# Patient Record
Sex: FEMALE | NOT HISPANIC OR LATINO | Employment: STUDENT | URBAN - METROPOLITAN AREA
[De-identification: names, ages, dates, MRNs, and addresses within clinical notes are randomized per-mention and may not be internally consistent; named-entity substitution may affect disease eponyms.]

---

## 2018-11-13 ENCOUNTER — MEDICAL CORRESPONDENCE (OUTPATIENT)
Dept: HEALTH INFORMATION MANAGEMENT | Facility: CLINIC | Age: 28
End: 2018-11-13

## 2018-11-16 ENCOUNTER — RADIANT APPOINTMENT (OUTPATIENT)
Dept: MAMMOGRAPHY | Facility: CLINIC | Age: 28
End: 2018-11-16
Payer: COMMERCIAL

## 2018-11-16 DIAGNOSIS — N64.52 BREAST DISCHARGE: ICD-10-CM

## 2018-11-16 DIAGNOSIS — N64.4 PAIN OF BOTH BREASTS: ICD-10-CM

## 2019-02-05 ENCOUNTER — TRANSFERRED RECORDS (OUTPATIENT)
Dept: HEALTH INFORMATION MANAGEMENT | Facility: CLINIC | Age: 29
End: 2019-02-05

## 2019-02-05 ENCOUNTER — MEDICAL CORRESPONDENCE (OUTPATIENT)
Dept: HEALTH INFORMATION MANAGEMENT | Facility: CLINIC | Age: 29
End: 2019-02-05

## 2019-02-09 ENCOUNTER — ANCILLARY PROCEDURE (OUTPATIENT)
Dept: MRI IMAGING | Facility: CLINIC | Age: 29
End: 2019-02-09
Payer: COMMERCIAL

## 2019-02-09 DIAGNOSIS — G93.0 INTRACRANIAL ARACHNOID CYSTS: ICD-10-CM

## 2019-02-09 RX ORDER — GADOBUTROL 604.72 MG/ML
7.5 INJECTION INTRAVENOUS ONCE
Status: COMPLETED | OUTPATIENT
Start: 2019-02-09 | End: 2019-02-09

## 2019-02-09 RX ADMIN — GADOBUTROL 6 ML: 604.72 INJECTION INTRAVENOUS at 15:49

## 2019-02-09 NOTE — DISCHARGE INSTRUCTIONS
MRI Contrast Discharge Instructions    The IV contrast you received today will pass out of your body in your  urine. This will happen in the next 24 hours. You will not feel this process.  Your urine will not change color.    Drink at least 4 extra glasses of water or juice today (unless your doctor  has restricted your fluids). This reduces the stress on your kidneys.  You may take your regular medicines.    If you are on dialysis: It is best to have dialysis today.    If you have a reaction: Most reactions happen right away. If you have  any new symptoms after leaving the hospital (such as hives or swelling),  call your hospital at the correct number below. Or call your family doctor.  If you have breathing distress or wheezing, call 911.    Special instructions: ***    I have read and understand the above information.    Signature:______________________________________ Date:___________    Staff:__________________________________________ Date:___________     Time:__________    [facility] Departments:    ___[facility]: [phone]  ___[facility]: [phone]  ___[facility]: [phone] ___[facility]: [phone]  ___[facility]: [phone]  ___[facility]: [phone]  ___[facility] [phone]  ___[facility]: [phone]  ___[phone]

## 2019-02-15 ENCOUNTER — HEALTH MAINTENANCE LETTER (OUTPATIENT)
Age: 29
End: 2019-02-15

## 2019-03-06 ENCOUNTER — OFFICE VISIT (OUTPATIENT)
Dept: NEUROSURGERY | Facility: CLINIC | Age: 29
End: 2019-03-06
Payer: COMMERCIAL

## 2019-03-06 VITALS
OXYGEN SATURATION: 98 % | HEIGHT: 63 IN | SYSTOLIC BLOOD PRESSURE: 99 MMHG | WEIGHT: 132.7 LBS | BODY MASS INDEX: 23.51 KG/M2 | HEART RATE: 59 BPM | DIASTOLIC BLOOD PRESSURE: 63 MMHG

## 2019-03-06 DIAGNOSIS — G93.0 ARACHNOID CYST: Primary | ICD-10-CM

## 2019-03-06 SDOH — HEALTH STABILITY: MENTAL HEALTH: HOW OFTEN DO YOU HAVE A DRINK CONTAINING ALCOHOL?: NEVER

## 2019-03-06 ASSESSMENT — PAIN SCALES - GENERAL: PAINLEVEL: NO PAIN (0)

## 2019-03-06 ASSESSMENT — MIFFLIN-ST. JEOR: SCORE: 1293.03

## 2019-03-06 NOTE — LETTER
3/6/2019     RE: Narcisa Gorman  [address]  [facility] [address]     Dear Colleague,    Thank you for referring your patient, Narcisa Gorman, to the [facility]UROS[facility]ERY at [facility]. Please see a copy of my visit note below.    We saw Mrs. Sarah Gorman and her  for the first time today in [facility] for evaluation of her arachnoid cyst. In summary, she is a 28-year-old female who was initially seen in [place] for complaints of left third finger, fourth finger, and leg numbness. An MRI of the brain showed an arachnoid cyst in the left middle fossa with midline shift but no hydrocephalus. She continues to have intermittent numbness in her left-sided extremities with intermittent tingling over the back of her head, which is new in the past few months. She also has mild, intermittent vertigo.    Currently, Mrs. Gorman is doing well and appears pleasant. She notes of mild, intermittent occipital headaches that spread towards her frontal region. Her left-sided numbness lasts for an hour and is more noticeable at night. She has mild tenderness in her upper neck. She denies left-sided weakness. There does not seem to be a positional component to her presentation. She denies seizures. She states she is doing well with her academic endeavors without any cognitive changes.    SOCIAL HISTORY: Lives with her  in [place], [place] for 1 year now. Currently has no children. 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    PHYSICAL EXAM: On exam, her general appearance is good. Extraocular movements intact. She moves all extremities equally and with good coordination. The remainder of her gross neurological examination is normal.    REVIEW OF STUDIES: Her MRI shows a large (>8 cm) left middle fossa arachnoid cyst. There is mild mass effect on the frontal and temporal lobes. There is no edema in the brain. In addition,  there is a mixed signal lesion in the right basal ganglia. On the T2 images, this appeared to be either a small cavernous malformation or a prominent perivascular space. The SWI images do not show this lesion, suggesting that it is a prominent perivascular space.    IMPRESSION AND PLAN: We discussed indications for surgical treatment of arachnoid cysts. She does not have any clear symptoms from this and we do not recommend any treatment. She was concerned that the cyst has grown based on the reports. We were unable to open her previous studies. It would be quite unusual for an arachnoid cyst to grow in a short period of time. Regardless, we do not see any indication for treatment of this cyst at this time. We will plan on repeating an MRI in 1 year and we asked her to bring her previous images back in a readable format. The etiology of her intermittent left-sided paresthesias is unclear but it is certainly not due to this cyst. We will see her back in 1 year with a follow-up MRI.     I appreciate your confidence in involving us in your patient's care. Please do not hesitate to contact us with questions. We will keep you informed of her progress.     MD VICENTE HINTON, Yared Parry, am serving as a scribe to document services personally performed by Alexus Barton MD, based upon my observations and the provider's statements to me. All documentation has been reviewed by the aforementioned doctor prior to being entered into the official medical record.    I, Alexus Barton, attest that above named individual is acting in scribe capacity, has observed my performance of the services and has documented them in accordance with my direction. The documentation recorded by the scribe accurately reflects the service I personally performed and the decisions made by me.

## 2019-03-06 NOTE — PROGRESS NOTES
We saw Mrs. Sarah Gorman and her  for the first time today in [facility] for evaluation of her arachnoid cyst. In summary, she is a 28-year-old woman who was initially seen in [place] for complaints of left third finger, fourth finger, and leg numbness. An MRI of the brain showed an arachnoid cyst in the left middle fossa with midline shift but no hydrocephalus. She continues to have intermittent numbness in her left-sided extremities with intermittent tingling over the back of her head, which is new in the past few months. She also has mild, intermittent vertigo.    Currently, Mrs. Gorman is doing well and appears pleasant. She notes of mild, intermittent occipital headaches that spread towards her frontal region. Her left-sided numbness lasts for an hour and is more noticeable at night. She has mild tenderness in her upper neck. She denies left-sided weakness. There does not seem to be a positional component to her presentation. She denies seizures. She states she is doing well with her academic endeavors without any cognitive changes.    SOCIAL HISTORY: Lives with her  in [place], [place] for 1 year now. Currently has no children. Studying [employer] at [employer].    PHYSICAL EXAM: On exam, her general appearance is good. Extraocular movements intact. She moves all extremities equally and with good coordination. The remainder of her gross neurological examination is normal.    REVIEW OF STUDIES: Her MRI shows a large (>8 cm) left middle fossa arachnoid cyst. There is mild mass effect on the frontal and temporal lobes. There is no edema in the brain. In addition, there is a mixed signal lesion in the right basal ganglia. On the T2 images, this appeared to be either a small cavernous malformation or a prominent perivascular space. The SWI images do not show this lesion, suggesting that it is a prominent perivascular space.    IMPRESSION AND PLAN: We  discussed indications for surgical treatment of arachnoid cysts. She does not have any clear symptoms from this and we do not recommend any treatment. She was concerned that the cyst has grown based on the reports. We were unable to open her previous studies. It would be quite unusual for an arachnoid cyst to grow in a short period of time. Regardless, we do not see any indication for treatment of this cyst at this time. We will plan on repeating an MRI in 1 year and we asked her to bring her previous images back in a readable format. The etiology of her intermittent left-sided paresthesias is unclear but it is certainly not due to this cyst. We will see her back in 1 year with a follow-up MRI.     I appreciate your confidence in involving us in her care. Please do not hesitate to contact us with questions. We will keep you informed of her progress.     MD VICENTE HINTON Asef Chowdhury, am serving as a scribe to document services personally performed by Alexus Barton MD, based upon my observations and the provider's statements to me. All documentation has been reviewed by the aforementioned doctor prior to being entered into the official medical record.    I, Alexus Barton, attest that above named individual is acting in scribe capacity, has observed my performance of the services and has documented them in accordance with my direction. The documentation recorded by the scribe accurately reflects the service I personally performed and the decisions made by me.

## 2019-03-06 NOTE — NURSING NOTE
Chief Complaint   Patient presents with     Consult     [facility] NEW - ARACHNOID CYST       Arsh Watts, EMT

## 2019-03-12 ENCOUNTER — TELEPHONE (OUTPATIENT)
Dept: NEUROSURGERY | Facility: CLINIC | Age: 29
End: 2019-03-12

## 2020-01-13 ENCOUNTER — TRANSFERRED RECORDS (OUTPATIENT)
Dept: HEALTH INFORMATION MANAGEMENT | Facility: CLINIC | Age: 30
End: 2020-01-13

## 2020-01-23 ENCOUNTER — TRANSFERRED RECORDS (OUTPATIENT)
Dept: HEALTH INFORMATION MANAGEMENT | Facility: CLINIC | Age: 30
End: 2020-01-23

## 2020-01-23 ENCOUNTER — MEDICAL CORRESPONDENCE (OUTPATIENT)
Dept: HEALTH INFORMATION MANAGEMENT | Facility: CLINIC | Age: 30
End: 2020-01-23

## 2020-01-30 ENCOUNTER — OFFICE VISIT (OUTPATIENT)
Dept: OBGYN | Facility: CLINIC | Age: 30
End: 2020-01-30
Attending: PHYSICIAN ASSISTANT
Payer: COMMERCIAL

## 2020-01-30 VITALS
BODY MASS INDEX: 24.27 KG/M2 | WEIGHT: 137 LBS | SYSTOLIC BLOOD PRESSURE: 102 MMHG | DIASTOLIC BLOOD PRESSURE: 68 MMHG | HEART RATE: 66 BPM | HEIGHT: 63 IN

## 2020-01-30 DIAGNOSIS — E28.2 PCOS (POLYCYSTIC OVARIAN SYNDROME): Primary | ICD-10-CM

## 2020-01-30 PROCEDURE — G0463 HOSPITAL OUTPT CLINIC VISIT: HCPCS | Mod: ZF

## 2020-01-30 ASSESSMENT — ANXIETY QUESTIONNAIRES
3. WORRYING TOO MUCH ABOUT DIFFERENT THINGS: NOT AT ALL
7. FEELING AFRAID AS IF SOMETHING AWFUL MIGHT HAPPEN: NOT AT ALL
6. BECOMING EASILY ANNOYED OR IRRITABLE: NOT AT ALL
2. NOT BEING ABLE TO STOP OR CONTROL WORRYING: NOT AT ALL
1. FEELING NERVOUS, ANXIOUS, OR ON EDGE: NOT AT ALL
5. BEING SO RESTLESS THAT IT IS HARD TO SIT STILL: NOT AT ALL
GAD7 TOTAL SCORE: 0

## 2020-01-30 ASSESSMENT — MIFFLIN-ST. JEOR: SCORE: 1311.68

## 2020-01-30 ASSESSMENT — PATIENT HEALTH QUESTIONNAIRE - PHQ9
5. POOR APPETITE OR OVEREATING: NOT AT ALL
SUM OF ALL RESPONSES TO PHQ QUESTIONS 1-9: 0

## 2020-01-30 ASSESSMENT — PAIN SCALES - GENERAL: PAINLEVEL: NO PAIN (0)

## 2020-01-30 NOTE — PROGRESS NOTES
CC/HPI:   Narcisa Gorman is a 29 year old P0 presents with c/o irregular periods.  She has been seen at [facility] for this and had lab work that was c/w PCOS (her DHEAS was mildly elevated, but not concerning for adrenal tumor).  Periods are every 21-28 days.  Last >10-12 days- spotting 4-5 days, 2 days heavy then spotting.  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  She was seen with her  present today.     HISTORIES:  There is no problem list on file for this patient.    No past medical history on file.  No past surgical history on file.  No current outpatient medications on file.     Not on File  Social History     Socioeconomic History     Marital status:      Spouse name: Not on file     Number of children: Not on file     Years of education: Not on file     Highest education level: Not on file   Occupational History     Not on file   Social Needs     Financial resource strain: Not on file     Food insecurity:     Worry: Not on file     Inability: Not on file     Transportation needs:     Medical: Not on file     Non-medical: Not on file   Tobacco Use     Smoking status: Never Smoker     Smokeless tobacco: Never Used   Substance and Sexual Activity     Alcohol use: No     Frequency: Never     Drug use: Not on file     Sexual activity: Not on file   Lifestyle     Physical activity:     Days per week: Not on file     Minutes per session: Not on file     Stress: Not on file   Relationships     Social connections:     Talks on phone: Not on file     Gets together: Not on file     Attends Synagogue service: Not on file     Active member of club or organization: Not on file     Attends meetings of clubs or organizations: Not on file     Relationship status: Not on file     Intimate partner violence:     Fear of current or ex partner: Not on file     Emotionally abused: Not on file     Physically abused: Not on file     Forced sexual  "activity: Not on file   Other Topics Concern     Not on file   Social History Narrative     Not on file     No family history on file.       Gyn Hx:   Patient's last menstrual period was 01/18/2020.  Menses:   See HPI    Review Of Systems:  CONSTITUTIONAL: NEGATIVE for fever, chills  EYES: NEGATIVE for vision changes   RESP: NEGATIVE for significant cough or SOB  CV: NEGATIVE for chest pain, palpitations   GI: NEGATIVE for nausea, abdominal pain, heartburn, or change in bowel habits  : NEGATIVE for frequency, dysuria, or hematuria  MUSCULOSKELETAL: NEGATIVE for significant arthralgias or myalgia  NEURO: NEGATIVE for weakness, dizziness or paresthesias or headache    EXAM:  /68   Pulse 66   Ht 1.61 m (5' 3.39\")   Wt 62.1 kg (137 lb)   LMP 01/18/2020   Breastfeeding No   BMI 23.97 kg/m    Body mass index is 23.97 kg/m .    General - pleasant female in no acute distress.    ASSESSMENT    28 y/o P0 with self reported regular periods, though long lasting.  Likely desiring pregnancy within a year.    Plan    Offered fertility test now/in the next few months.  If we document oligo-ovulation now, when she desires conception we can start letrozole for ovulation induction.  She would like to do this, future lab orders entered.  She has no risk factors for tubal occlusion, so will hold on HSG for now.  Offered pelvic ultrasound to make sure there is not structural cause for her long periods (polyp/submucosal fibroid).  She could consider something for cycle control now as well to decrease the days of bleeding she is having.  She will return after her labs and ultrasound are complete to review the results and discuss possible treatment options for cycle control    Marizol March MD, FACOG    "

## 2020-01-30 NOTE — LETTER
1/30/2020       RE: Narcisa Gorman  [address]  [facility] [address]     Dear Colleague,    Thank you for referring your patient, Narcisa Gorman, to the [facility] at [facility]. Please see a copy of my visit note below.    CC/HPI:   Narcisa Gorman is a 29 year old P0 presents with c/o irregular periods.  She has been seen at [facility] for this and had lab work that was c/w PCOS (her DHEAS was mildly elevated, but not concerning for adrenal tumor).  Periods are every 21-28 days.  Last >10-12 days- spotting 4-5 days, 2 days heavy then spotting.  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  She was seen with her  present today.     HISTORIES:  There is no problem list on file for this patient.    No past medical history on file.  No past surgical history on file.  No current outpatient medications on file.     Not on File  Social History     Socioeconomic History     Marital status:      Spouse name: Not on file     Number of children: Not on file     Years of education: Not on file     Highest education level: Not on file   Occupational History     Not on file   Social Needs     Financial resource strain: Not on file     Food insecurity:     Worry: Not on file     Inability: Not on file     Transportation needs:     Medical: Not on file     Non-medical: Not on file   Tobacco Use     Smoking status: Never Smoker     Smokeless tobacco: Never Used   Substance and Sexual Activity     Alcohol use: No     Frequency: Never     Drug use: Not on file     Sexual activity: Not on file   Lifestyle     Physical activity:     Days per week: Not on file     Minutes per session: Not on file     Stress: Not on file   Relationships     Social connections:     Talks on phone: Not on file     Gets together: Not on file     Attends [employer] service: Not on file     Active member of  "club or organization: Not on file     Attends meetings of clubs or organizations: Not on file     Relationship status: Not on file     Intimate partner violence:     Fear of current or ex partner: Not on file     Emotionally abused: Not on file     Physically abused: Not on file     Forced sexual activity: Not on file   Other Topics Concern     Not on file   Social History Narrative     Not on file     No family history on file.       Gyn Hx:   Patient's last menstrual period was 01/18/2020.  Menses:   See HPI    Review Of Systems:  CONSTITUTIONAL: NEGATIVE for fever, chills  EYES: NEGATIVE for vision changes   RESP: NEGATIVE for significant cough or SOB  CV: NEGATIVE for chest pain, palpitations   GI: NEGATIVE for nausea, abdominal pain, heartburn, or change in bowel habits  : NEGATIVE for frequency, dysuria, or hematuria  MUSCULOSKELETAL: NEGATIVE for significant arthralgias or myalgia  NEURO: NEGATIVE for weakness, dizziness or paresthesias or headache    EXAM:  /68   Pulse 66   Ht 1.61 m (5' 3.39\")   Wt 62.1 kg (137 lb)   LMP 01/18/2020   Breastfeeding No   BMI 23.97 kg/m     Body mass index is 23.97 kg/m .    General - pleasant female in no acute distress.    ASSESSMENT    30 y/o P0 with self reported regular periods, though long lasting.  Likely desiring pregnancy within a year.    Plan    Offered fertility test now/in the next few months.  If we document oligo-ovulation now, when she desires conception we can start letrozole for ovulation induction.  She would like to do this, future lab orders entered.  She has no risk factors for tubal occlusion, so will hold on HSG for now.  Offered pelvic ultrasound to make sure there is not structural cause for her long periods (polyp/submucosal fibroid).  She could consider something for cycle control now as well to decrease the days of bleeding she is having.  She will return after her labs and ultrasound are complete to review the results and discuss " possible treatment options for cycle control    Marizol March MD, FACOG      Again, thank you for allowing me to participate in the care of your patient.      Sincerely,    Marizol March MD

## 2020-01-30 NOTE — LETTER
1/30/2020      RE: Narcisa Gorman  [address]  [facility] [address]       CC/HPI:   Narcisa Gorman is a 29 year old P0 presents with c/o irregular periods.  She has been seen at [facility] for this and had lab work that was c/w PCOS (her DHEAS was mildly elevated, but not concerning for adrenal tumor).  Periods are every 21-28 days.  Last >10-12 days- spotting 4-5 days, 2 days heavy then spotting.  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  She was seen with her  present today.     HISTORIES:  There is no problem list on file for this patient.    No past medical history on file.  No past surgical history on file.  No current outpatient medications on file.     Not on File  Social History     Socioeconomic History     Marital status:      Spouse name: Not on file     Number of children: Not on file     Years of education: Not on file     Highest education level: Not on file   Occupational History     Not on file   Social Needs     Financial resource strain: Not on file     Food insecurity:     Worry: Not on file     Inability: Not on file     Transportation needs:     Medical: Not on file     Non-medical: Not on file   Tobacco Use     Smoking status: Never Smoker     Smokeless tobacco: Never Used   Substance and Sexual Activity     Alcohol use: No     Frequency: Never     Drug use: Not on file     Sexual activity: Not on file   Lifestyle     Physical activity:     Days per week: Not on file     Minutes per session: Not on file     Stress: Not on file   Relationships     Social connections:     Talks on phone: Not on file     Gets together: Not on file     Attends Jainism service: Not on file     Active member of club or organization: Not on file     Attends meetings of clubs or organizations: Not on file     Relationship status: Not on file     Intimate partner violence:     Fear of current or ex partner: Not on file     " Emotionally abused: Not on file     Physically abused: Not on file     Forced sexual activity: Not on file   Other Topics Concern     Not on file   Social History Narrative     Not on file     No family history on file.       Gyn Hx:   Patient's last menstrual period was 01/18/2020.  Menses:   See HPI    Review Of Systems:  CONSTITUTIONAL: NEGATIVE for fever, chills  EYES: NEGATIVE for vision changes   RESP: NEGATIVE for significant cough or SOB  CV: NEGATIVE for chest pain, palpitations   GI: NEGATIVE for nausea, abdominal pain, heartburn, or change in bowel habits  : NEGATIVE for frequency, dysuria, or hematuria  MUSCULOSKELETAL: NEGATIVE for significant arthralgias or myalgia  NEURO: NEGATIVE for weakness, dizziness or paresthesias or headache    EXAM:  /68   Pulse 66   Ht 1.61 m (5' 3.39\")   Wt 62.1 kg (137 lb)   LMP 01/18/2020   Breastfeeding No   BMI 23.97 kg/m     Body mass index is 23.97 kg/m .    General - pleasant female in no acute distress.    ASSESSMENT    30 y/o P0 with self reported regular periods, though long lasting.  Likely desiring pregnancy within a year.    Plan    Offered fertility test now/in the next few months.  If we document oligo-ovulation now, when she desires conception we can start letrozole for ovulation induction.  She would like to do this, future lab orders entered.  She has no risk factors for tubal occlusion, so will hold on HSG for now.  Offered pelvic ultrasound to make sure there is not structural cause for her long periods (polyp/submucosal fibroid).  She could consider something for cycle control now as well to decrease the days of bleeding she is having.  She will return after her labs and ultrasound are complete to review the results and discuss possible treatment options for cycle control    Marizol March MD, FACOG    "

## 2020-01-31 ASSESSMENT — ANXIETY QUESTIONNAIRES: GAD7 TOTAL SCORE: 0

## 2020-02-19 DIAGNOSIS — E28.2 PCOS (POLYCYSTIC OVARIAN SYNDROME): ICD-10-CM

## 2020-02-19 LAB
ESTRADIOL SERPL-MCNC: 25 PG/ML
FSH SERPL-ACNC: 7.5 IU/L
LH SERPL-ACNC: 8.8 IU/L
TSH SERPL DL<=0.005 MIU/L-ACNC: 1.74 MU/L (ref 0.4–4)

## 2020-02-19 PROCEDURE — 82670 ASSAY OF TOTAL ESTRADIOL: CPT | Performed by: OBSTETRICS & GYNECOLOGY

## 2020-02-19 PROCEDURE — 83002 ASSAY OF GONADOTROPIN (LH): CPT | Performed by: OBSTETRICS & GYNECOLOGY

## 2020-02-19 PROCEDURE — 83001 ASSAY OF GONADOTROPIN (FSH): CPT | Performed by: OBSTETRICS & GYNECOLOGY

## 2020-02-19 PROCEDURE — 36415 COLL VENOUS BLD VENIPUNCTURE: CPT | Performed by: OBSTETRICS & GYNECOLOGY

## 2020-02-19 PROCEDURE — 84443 ASSAY THYROID STIM HORMONE: CPT | Performed by: OBSTETRICS & GYNECOLOGY

## 2020-02-19 PROCEDURE — 83520 IMMUNOASSAY QUANT NOS NONAB: CPT | Performed by: OBSTETRICS & GYNECOLOGY

## 2020-02-20 ENCOUNTER — MYC MEDICAL ADVICE (OUTPATIENT)
Dept: OBGYN | Facility: CLINIC | Age: 30
End: 2020-02-20

## 2020-02-21 ENCOUNTER — ANCILLARY PROCEDURE (OUTPATIENT)
Dept: ULTRASOUND IMAGING | Facility: CLINIC | Age: 30
End: 2020-02-21
Attending: OBSTETRICS & GYNECOLOGY
Payer: COMMERCIAL

## 2020-02-21 DIAGNOSIS — E28.2 PCOS (POLYCYSTIC OVARIAN SYNDROME): ICD-10-CM

## 2020-02-21 LAB — MIS SERPL-MCNC: 1.49 NG/ML (ref 0.18–11.71)

## 2020-02-21 PROCEDURE — 76830 TRANSVAGINAL US NON-OB: CPT

## 2020-03-03 ENCOUNTER — OFFICE VISIT (OUTPATIENT)
Dept: NEUROSURGERY | Facility: CLINIC | Age: 30
End: 2020-03-03
Payer: COMMERCIAL

## 2020-03-03 ENCOUNTER — ANCILLARY PROCEDURE (OUTPATIENT)
Dept: MRI IMAGING | Facility: CLINIC | Age: 30
End: 2020-03-03
Attending: NEUROLOGICAL SURGERY
Payer: COMMERCIAL

## 2020-03-03 VITALS
RESPIRATION RATE: 16 BRPM | SYSTOLIC BLOOD PRESSURE: 102 MMHG | OXYGEN SATURATION: 99 % | HEART RATE: 60 BPM | HEIGHT: 63 IN | BODY MASS INDEX: 24.43 KG/M2 | DIASTOLIC BLOOD PRESSURE: 70 MMHG | WEIGHT: 137.9 LBS

## 2020-03-03 DIAGNOSIS — G93.0 ARACHNOID CYST: ICD-10-CM

## 2020-03-03 DIAGNOSIS — G93.0 ARACHNOID CYST: Primary | ICD-10-CM

## 2020-03-03 ASSESSMENT — MIFFLIN-ST. JEOR: SCORE: 1311.7

## 2020-03-03 ASSESSMENT — PAIN SCALES - GENERAL: PAINLEVEL: NO PAIN (0)

## 2020-03-03 NOTE — LETTER
3/3/2020      RE: Narcisa Gorman  [address]  [facility] [address]       Service Date: 2020      Vickie Fountain NP   [facility]    [address] [place] [address]      RE: Narcisa Gorman    MRN: [id number]   : 1990      Dear Ms. Александр:       We saw Ms. Gorman [facility] in [facility] today for followup of a large left temporal arachnoid cyst.  Overall, she feels well and has been performing well in her studies.  She denies any significant headaches.  Since we last saw her, she has had 2 episodes of pain originating in the right occiput and radiating to the right eye.  Both of these episodes lasted about a minute and then resolved.  She also believes that her left-sided paresthesias have improved considerably.  She still has some intermittent numbness and tingling in the left ring and little fingers and in a patchy distribution in the left lower extremity.        Her gross neurological examination remains normal.  Her general appearance is good.      REVIEW OF STUDIES:  We went over her MRI from today with her.  The large arachnoid cyst over the left frontotemporal convexity is unchanged.  Though sizable and has a mild mass effect on the adjacent frontal and temporal lobes, there is no edema in the brain, and her ventricle size is normal.      IMPRESSION AND PLAN:  As before, we do not have any indications for treating this arachnoid cyst.  We went over the general treatment options of cyst drainage, cyst fenestration and cystoperitoneal shunting.  Given her clinical and radiographic stability, we will see her back in 2 years with a followup MRI.      Please do not hesitate to contact us with questions.      Sincerely,      Alexus Barton MD     D: 2020   T: 2020   MT: allyssa      Name:     NARCISA GORMAN   MRN:      [id number]        Account:      [id number]   :      1990           Service Date: 2020       Document: [id number]

## 2020-03-03 NOTE — NURSING NOTE
Chief Complaint   Patient presents with     RECHECK     [facility] RETURN ARACHNOID CYST        Yaya Casanova, EMT

## 2020-03-03 NOTE — LETTER
3/3/2020       RE: Narcisa Gorman  [address]  [facility] [address]     Dear Colleague,    Thank you for referring your patient, Narcisa Gorman, to the [facility] NEUROSURGERY at [facility]. Please see a copy of my visit note below.    Service Date: 2020      Vickie Fountain NP   [facility]    [address] [place] [address]      RE: Narcisa Gorman    MRN: [id number]   : 1990      Dear MsAj Александр:       We saw Ms. Gorman back in [facility] today for followup of a large left temporal arachnoid cyst.  Overall, she feels well and has been performing well in her studies.  She denies any significant headaches.  Since we last saw her, she has had 2 episodes of pain originating in the right occiput and radiating to the right eye.  Both of these episodes lasted about a minute and then resolved.  She also believes that her left-sided paresthesias have improved considerably.  She still has some intermittent numbness and tingling in the left ring and little fingers and in a patchy distribution in the left lower extremity.        Her gross neurological examination remains normal.  Her general appearance is good.      REVIEW OF STUDIES:  We went over her MRI from today with her.  The large arachnoid cyst over the left frontotemporal convexity is unchanged.  Though sizable and has a mild mass effect on the adjacent frontal and temporal lobes, there is no edema in the brain, and her ventricle size is normal.      IMPRESSION AND PLAN:  As before, we do not have any indications for treating this arachnoid cyst.  We went over the general treatment options of cyst drainage, cyst fenestration and cystoperitoneal shunting.  Given her clinical and radiographic stability, we will see her back in 2 years with a followup MRI.      Please do not hesitate to contact us with questions.      Sincerely,      Alexus  MD Oralia       D: 2020   T: 2020   MT: allyssa      Name:     BRIDGET EWING   MRN:      [id number]        Account:      [id number]   :      1990           Service Date: 2020      Document: [id number]

## 2020-03-03 NOTE — PROGRESS NOTES
Service Date: 2020      Vickie Fountain NP   [address] [address]      RE: Narcisa Gorman    MRN: [id number]   : 1990      Dear Ms. Александр:       We saw Ms. Gorman back in [facility] today for followup of a large left temporal arachnoid cyst.  Overall, she feels well and has been performing well in her studies.  She denies any significant headaches.  Since we last saw her, she has had 2 episodes of pain originating in the right occiput and radiating to the right eye.  Both of these episodes lasted about a minute and then resolved.  She also believes that her left-sided paresthesias have improved considerably.  She still has some intermittent numbness and tingling in the left ring and little fingers and in a patchy distribution in the left lower extremity.        Her gross neurological examination remains normal.  Her general appearance is good.      REVIEW OF STUDIES:  We went over her MRI from today with her.  The large arachnoid cyst over the left frontotemporal convexity is unchanged.  Though sizable and has a mild mass effect on the adjacent frontal and temporal lobes, there is no edema in the brain, and her ventricle size is normal.      IMPRESSION AND PLAN:  As before, we do not have any indications for treating this arachnoid cyst.  We went over the general treatment options of cyst drainage, cyst fenestration and cystoperitoneal shunting.  Given her clinical and radiographic stability, we will see her back in 2 years with a followup MRI.      Please do not hesitate to contact us with questions.      Sincerely,      MD CUBA Klely MD             D: 2020   T: 2020   MT: allyssa      Name:     NARCISA GORMAN   MRN:      [id number]        Account:      [id number]   :      1990           Service Date: 2020      Document: [id number]

## 2020-03-09 DIAGNOSIS — E28.2 PCOS (POLYCYSTIC OVARIAN SYNDROME): ICD-10-CM

## 2020-03-09 LAB — PROGEST SERPL-MCNC: 24.1 NG/ML

## 2020-03-09 PROCEDURE — 36415 COLL VENOUS BLD VENIPUNCTURE: CPT | Performed by: OBSTETRICS & GYNECOLOGY

## 2020-03-09 PROCEDURE — 84144 ASSAY OF PROGESTERONE: CPT | Performed by: OBSTETRICS & GYNECOLOGY

## 2020-03-11 ENCOUNTER — HEALTH MAINTENANCE LETTER (OUTPATIENT)
Age: 30
End: 2020-03-11

## 2021-01-03 ENCOUNTER — HEALTH MAINTENANCE LETTER (OUTPATIENT)
Age: 31
End: 2021-01-03

## 2021-04-25 ENCOUNTER — HEALTH MAINTENANCE LETTER (OUTPATIENT)
Age: 31
End: 2021-04-25

## 2021-10-10 ENCOUNTER — HEALTH MAINTENANCE LETTER (OUTPATIENT)
Age: 31
End: 2021-10-10

## 2021-11-12 ENCOUNTER — ANCILLARY PROCEDURE (OUTPATIENT)
Dept: ULTRASOUND IMAGING | Facility: CLINIC | Age: 31
End: 2021-11-12
Attending: PHYSICIAN ASSISTANT
Payer: COMMERCIAL

## 2021-11-12 DIAGNOSIS — R22.31 MASS OF RIGHT AXILLA: ICD-10-CM

## 2021-11-12 PROCEDURE — 76882 US LMTD JT/FCL EVL NVASC XTR: CPT | Mod: RT | Performed by: RADIOLOGY

## 2022-01-18 ENCOUNTER — OFFICE VISIT (OUTPATIENT)
Dept: FAMILY MEDICINE | Facility: CLINIC | Age: 32
End: 2022-01-18
Payer: COMMERCIAL

## 2022-01-18 VITALS
SYSTOLIC BLOOD PRESSURE: 105 MMHG | TEMPERATURE: 97.7 F | DIASTOLIC BLOOD PRESSURE: 66 MMHG | OXYGEN SATURATION: 98 % | HEART RATE: 62 BPM | BODY MASS INDEX: 25.39 KG/M2 | HEIGHT: 63 IN | WEIGHT: 143.3 LBS

## 2022-01-18 DIAGNOSIS — R79.89 ELEVATED SERUM CREATININE: ICD-10-CM

## 2022-01-18 DIAGNOSIS — N92.6 PROLONGED PERIODS: ICD-10-CM

## 2022-01-18 DIAGNOSIS — R22.31 MASS OF RIGHT AXILLA: Primary | ICD-10-CM

## 2022-01-18 LAB
ANION GAP SERPL CALCULATED.3IONS-SCNC: 2 MMOL/L (ref 3–14)
BUN SERPL-MCNC: 12 MG/DL (ref 7–30)
CALCIUM SERPL-MCNC: 9.1 MG/DL (ref 8.5–10.1)
CHLORIDE BLD-SCNC: 105 MMOL/L (ref 94–109)
CO2 SERPL-SCNC: 31 MMOL/L (ref 20–32)
CREAT SERPL-MCNC: 1 MG/DL (ref 0.52–1.04)
GFR SERPL CREATININE-BSD FRML MDRD: 76 ML/MIN/1.73M2
GLUCOSE BLD-MCNC: 88 MG/DL (ref 70–99)
POTASSIUM BLD-SCNC: 4 MMOL/L (ref 3.4–5.3)
SODIUM SERPL-SCNC: 138 MMOL/L (ref 133–144)

## 2022-01-18 PROCEDURE — 99203 OFFICE O/P NEW LOW 30 MIN: CPT | Performed by: FAMILY MEDICINE

## 2022-01-18 PROCEDURE — 36415 COLL VENOUS BLD VENIPUNCTURE: CPT | Performed by: FAMILY MEDICINE

## 2022-01-18 PROCEDURE — 80048 BASIC METABOLIC PNL TOTAL CA: CPT | Performed by: FAMILY MEDICINE

## 2022-01-18 ASSESSMENT — MIFFLIN-ST. JEOR: SCORE: 1328.89

## 2022-01-19 ENCOUNTER — ANCILLARY PROCEDURE (OUTPATIENT)
Dept: ULTRASOUND IMAGING | Facility: CLINIC | Age: 32
End: 2022-01-19
Attending: FAMILY MEDICINE
Payer: COMMERCIAL

## 2022-01-19 DIAGNOSIS — R22.31 MASS OF RIGHT AXILLA: ICD-10-CM

## 2022-01-19 PROCEDURE — 76882 US LMTD JT/FCL EVL NVASC XTR: CPT | Mod: RT | Performed by: RADIOLOGY

## 2022-01-19 NOTE — RESULT ENCOUNTER NOTE
Here are your lab results from your recent visit...  -Your basic metabolic panel (which includes electrolyte levels, blood sugar level and kidney function tests) looks good.  The anion gap is fine- we're only concerned with elevated levels.  Your creatinine/GFR levels look very good now- no concerns.    Please let me know if you have any questions.  Best,   Eloy Freeman MD

## 2022-01-20 NOTE — RESULT ENCOUNTER NOTE
The ultrasound looked normal again, but the radiologist suggested we could get a mammogram to check for ectopic breast tissue (ectopic just means it's in a different place than usual). I see you have the surgery appointment already tomorrow, though, so you can get that set up through them.    Please let me know if you have any questions.  Best,   Eloy Freeman MD

## 2022-01-21 ENCOUNTER — TELEPHONE (OUTPATIENT)
Dept: SURGERY | Facility: CLINIC | Age: 32
End: 2022-01-21

## 2022-01-21 ENCOUNTER — OFFICE VISIT (OUTPATIENT)
Dept: SURGERY | Facility: CLINIC | Age: 32
End: 2022-01-21
Attending: FAMILY MEDICINE
Payer: COMMERCIAL

## 2022-01-21 VITALS
SYSTOLIC BLOOD PRESSURE: 92 MMHG | DIASTOLIC BLOOD PRESSURE: 60 MMHG | HEART RATE: 64 BPM | HEIGHT: 63 IN | WEIGHT: 143 LBS | OXYGEN SATURATION: 99 % | BODY MASS INDEX: 25.34 KG/M2

## 2022-01-21 DIAGNOSIS — R22.31 MASS OF RIGHT AXILLA: ICD-10-CM

## 2022-01-21 PROCEDURE — 99203 OFFICE O/P NEW LOW 30 MIN: CPT | Performed by: SURGERY

## 2022-01-21 ASSESSMENT — MIFFLIN-ST. JEOR: SCORE: 1327.53

## 2022-01-21 NOTE — PROGRESS NOTES
"[facility] Surgery Clinic Consultation    CHIEF COMPLAINT:  Chief Complaint   Patient presents with     Consult     mass of right axilla       HISTORY OF PRESENT ILLNESS:  Narcisa Gorman is a 31 year old female who is seen in consultation at the request of Dr. Freeman for evaluation of right axillary mass.  The patient reports that she has been noticing a lump present in the right axilla for 3 to 4 months.  The lump is always present but does increase in size with the patient's menstrual cycle.  Patient denies any pain associated with the lump.  She does describe feeling pressure/discomfort associated with the lump.  No erythema or drainage associated with the lump.  The patient has not noticed any breast masses, breast pain, or nipple discharge bilaterally.  She has not noticed any masses present in the left axilla.  No family history of breast cancer.  The patient has undergone ultrasound evaluation of the right axilla x2.  No significant findings were noted on either exam.    REVIEW OF SYSTEMS:  Constitutional: No fevers or chills  Eyes: No blurred or double vision  HENT: Denies headaches, No rhinorrhea, No sore throat  Respiratory: No cough or shortness of breath  Cardiovascular: Denies chest pain or palpitations  Gastrointestinal: Recent abdominal pain, now resolved  Genitourinary: No hematuria or dysuria  Musculoskeletal: Denies arthralgias or myalgias  Neurologic: No numbness or tingling  Integumentary: No skin rashes    Past medical history:  -Assessed and noncontributory    No past surgical history on file.    Family history:  -Grandmother with diabetes mellitus    Social History     Tobacco Use     Smoking status: Never Smoker     Smokeless tobacco: Never Used   Substance Use Topics     Alcohol use: No       Patient Active Problem List   Diagnosis     Arachnoid cyst       No Known Allergies    No current outpatient medications on file.       Vitals: BP 92/60   Pulse 64   Ht 1.608 m (5' 3.3\")   " Wt 64.9 kg (143 lb)   LMP 01/07/2022   SpO2 99%   BMI 25.09 kg/m    BMI= Body mass index is 25.09 kg/m .    EXAM:  General: Vital signs reviewed, in no apparent distress  Eyes: Anicteric  HENT: Normocephalic, atraumatic, trachea midline   Respiratory: Breathing nonlabored  Cardiovascular: Regular rate and rhythm  Breast: No obviously palpable breast masses bilaterally  Lymph: No palpable left axillary lymphadenopathy; high in the right axilla, not contiguous with the patient's breast tissue, there is an approximately 3 cm smooth, firm, nonmobile mass  Musculoskeletal: No gross deformities  Neurologic: Grossly nonfocal exam  Psychiatric: Normal mood, affect and insight  Integumentary: Warm and dry    All labs and imaging personally reviewed and significant for:   US UPPER EXTREMITY NON VASCULAR RIGHT, 11/12/2021 2:30 PM     Indication: Mass of right axilla     Comparison: None     Findings:   Grayscale and color Doppler imaging of the patient's palpable area of  concern in the right axilla was performed by the ultrasound  technologist and radiology resident. Sonographic images of the right  axilla demonstrate a few benign-appearing axillary lymph nodes. No  sonographic abnormality is identified.     Additional imaging of the patient's left axilla for comparison was  performed and also demonstrates no sonographic abnormality.                                                                      Impression: No sonographic abnormality in the patient's palpable area  of concern in the right axilla.    US UPPER EXTREMITY NON VASCULAR RIGHT, 1/19/2022 11:26 AM     Indication: R axillary smooth cyst-like lesion, recurs monthly,  large/uncomfortable w/ cycle-- last ultrasound done in 11/21 when cyst  small; Mass of right axilla      Comparison: Right upper extremity ultrasound 11/12/2021     Findings: Focused sonographic evaluation of the right axilla was  performed by technologist and radiologist. No corresponding mass  or  collection correlating with area of palpable concern. Compared to the  contralateral left axilla, there is slightly increased muscle bulk  demonstrated in the right axilla.                                                                      Impression: No focal mass or abscess correlating with the palpable  right axillary mass. Given the palpable abnormality correlates with  the menstrual cycle, findings may represent ectopic breast tissue.  Further evaluation could be performed with mammogram to establish  baseline and to evaluate for ectopic tissue.    ASSESSMENT:  Narcisa Gorman is a 31 year old who presents with right axillary mass of uncertain etiology.  Significant pertinent co-morbidities include: None.       PLAN:  At this time, I recommend proceeding with additional imaging to further evaluate the etiology of the patient's palpable right axillary mass.  The mass is high in the right axilla does not appear to be contiguous with the patient's right breast tissue.  I have recommended proceeding with MRI, as this mass may be breast, lymphatic, or musculoskeletal in etiology.  I will plan to contact the patient directly when the results of the MRI are available.  Patient is in agreement with this plan.    It was my pleasure to participate in the care of Narcisa Gorman in clinic today. Thank you for this consultation.         Loyda Denny MD    Please route or send letter to:  Primary Care Provider (PCP) and Referring Provider

## 2022-01-21 NOTE — LETTER
January 21, 2022          Mohini Freeman MD  [address] [address]      RE:   Narcisa Gorman 1990      Dear Colleague,    Thank you for referring your patient, Narcisa Gorman, to [facility] at [facility]. Please see a copy of my visit note below.    Narcisa Gorman is a 31 year old female who is seen in consultation at the request of Dr. Freeman for evaluation of right axillary mass.  The patient reports that she has been noticing a lump present in the right axilla for 3 to 4 months.  The lump is always present but does increase in size with the patient's menstrual cycle.  Patient denies any pain associated with the lump.  She does describe feeling pressure/discomfort associated with the lump.  No erythema or drainage associated with the lump.  The patient has not noticed any breast masses, breast pain, or nipple discharge bilaterally.  She has not noticed any masses present in the left axilla.  No family history of breast cancer.  The patient has undergone ultrasound evaluation of the right axilla x2.  No significant findings were noted on either exam.     REVIEW OF SYSTEMS:  Constitutional: No fevers or chills  Eyes: No blurred or double vision  HENT: Denies headaches, No rhinorrhea, No sore throat  Respiratory: No cough or shortness of breath  Cardiovascular: Denies chest pain or palpitations  Gastrointestinal: Recent abdominal pain, now resolved  Genitourinary: No hematuria or dysuria  Musculoskeletal: Denies arthralgias or myalgias  Neurologic: No numbness or tingling  Integumentary: No skin rashes     Past medical history:  -Assessed and noncontributory     Past Surgical History   No past surgical history on file.        Family history:  -Grandmother with diabetes mellitus     Social History           Tobacco Use     Smoking status: Never Smoker     Smokeless tobacco: Never Used   Substance Use Topics     Alcohol use: No             Patient Active  "Problem List   Diagnosis     Arachnoid cyst         No Known Allergies     Current Outpatient Prescriptions   No current outpatient medications on file.            Vitals: BP 92/60   Pulse 64   Ht 1.608 m (5' 3.3\")   Wt 64.9 kg (143 lb)   LMP 01/07/2022   SpO2 99%   BMI 25.09 kg/m    BMI= Body mass index is 25.09 kg/m .     EXAM:  General: Vital signs reviewed, in no apparent distress  Eyes: Anicteric  HENT: Normocephalic, atraumatic, trachea midline   Respiratory: Breathing nonlabored  Cardiovascular: Regular rate and rhythm  Breast: No obviously palpable breast masses bilaterally  Lymph: No palpable left axillary lymphadenopathy; high in the right axilla, not contiguous with the patient's breast tissue, there is an approximately 3 cm smooth, firm, nonmobile mass  Musculoskeletal: No gross deformities  Neurologic: Grossly nonfocal exam  Psychiatric: Normal mood, affect and insight  Integumentary: Warm and dry     All labs and imaging personally reviewed and significant for:   US UPPER EXTREMITY NON VASCULAR RIGHT, 11/12/2021 2:30 PM     Indication: Mass of right axilla     Comparison: None     Findings:   Grayscale and color Doppler imaging of the patient's palpable area of  concern in the right axilla was performed by the ultrasound  technologist and radiology resident. Sonographic images of the right  axilla demonstrate a few benign-appearing axillary lymph nodes. No  sonographic abnormality is identified.     Additional imaging of the patient's left axilla for comparison was  performed and also demonstrates no sonographic abnormality.                                                                      Impression: No sonographic abnormality in the patient's palpable area  of concern in the right axilla.     US UPPER EXTREMITY NON VASCULAR RIGHT, 1/19/2022 11:26 AM     Indication: R axillary smooth cyst-like lesion, recurs monthly,  large/uncomfortable w/ cycle-- last ultrasound done in 11/21 when " cyst  small; Mass of right axilla      Comparison: Right upper extremity ultrasound 11/12/2021     Findings: Focused sonographic evaluation of the right axilla was  performed by technologist and radiologist. No corresponding mass or  collection correlating with area of palpable concern. Compared to the  contralateral left axilla, there is slightly increased muscle bulk  demonstrated in the right axilla.                                                                      Impression: No focal mass or abscess correlating with the palpable  right axillary mass. Given the palpable abnormality correlates with  the menstrual cycle, findings may represent ectopic breast tissue.  Further evaluation could be performed with mammogram to establish  baseline and to evaluate for ectopic tissue.     ASSESSMENT:  Narcisa Gorman is a 31 year old who presents with right axillary mass of uncertain etiology.  Significant pertinent co-morbidities include: None.         PLAN:  At this time, I recommend proceeding with additional imaging to further evaluate the etiology of the patient's palpable right axillary mass.  The mass is high in the right axilla does not appear to be contiguous with the patient's right breast tissue.  I have recommended proceeding with MRI, as this mass may be breast, lymphatic, or musculoskeletal in etiology.  I will plan to contact the patient directly when the results of the MRI are available.  Patient is in agreement with this plan.           Again, thank you for allowing me to participate in the care of your patient.      Sincerely,      Loyda Denny MD

## 2022-02-02 ENCOUNTER — TELEPHONE (OUTPATIENT)
Dept: SURGERY | Facility: CLINIC | Age: 32
End: 2022-02-02
Payer: COMMERCIAL

## 2022-02-02 DIAGNOSIS — R22.31 MASS OF RIGHT AXILLA: Primary | ICD-10-CM

## 2022-02-02 NOTE — TELEPHONE ENCOUNTER
New orders placed    Message left for patient providing her with scheduling number    Breanna Knott RN-BSN

## 2022-02-02 NOTE — TELEPHONE ENCOUNTER
Patient saw Dr. Denny 1/21/22 for mass in armpit. She was scheduled for a breast MR yeterday. The radiologist told them that the breast MR would not work for the mass in arm pit, and did not do the MR. They told patient to request a new order for a chest and shoulder MR.    [phone]  OK to leave VM

## 2022-02-10 ENCOUNTER — OFFICE VISIT (OUTPATIENT)
Dept: OBGYN | Facility: CLINIC | Age: 32
End: 2022-02-10
Attending: FAMILY MEDICINE
Payer: COMMERCIAL

## 2022-02-10 VITALS
SYSTOLIC BLOOD PRESSURE: 107 MMHG | WEIGHT: 142.8 LBS | DIASTOLIC BLOOD PRESSURE: 71 MMHG | HEIGHT: 63 IN | HEART RATE: 67 BPM | BODY MASS INDEX: 25.3 KG/M2

## 2022-02-10 DIAGNOSIS — N92.6 PROLONGED PERIODS: ICD-10-CM

## 2022-02-10 PROCEDURE — 99212 OFFICE O/P EST SF 10 MIN: CPT | Performed by: OBSTETRICS & GYNECOLOGY

## 2022-02-10 PROCEDURE — G0463 HOSPITAL OUTPT CLINIC VISIT: HCPCS

## 2022-02-10 ASSESSMENT — MIFFLIN-ST. JEOR: SCORE: 1321.87

## 2022-02-10 NOTE — PROGRESS NOTES
"S:  Pt is a 30 y/o P0 who presents today for evaluation of long periods.  Her periods are regular but the bleeding lasts for 10-12 days.  They start with spotting for a week then heavier bleeding for 3 days then the bleeding stops.  Her  pap is current at [place].  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  She had normal ovarian reserve testing and a pelvic ultrasound when she was seen last here in 1/2020.    O: /71   Pulse 67   Ht 1.6 m (5' 3\")   Wt 64.8 kg (142 lb 12.8 oz)   BMI 25.30 kg/m      gen-pleasant, NAD    A:  30 y/o P0 with recent development of long periods    P:  Recommended starting with a pelvic ultrasound to evaluate for endometrial structural abnormality with a phone visit after to review the results and discuss next steps.  Very briefly discussed treatment options given that she does not desire pregnancy at this time.      Marizol March MD, FACOG            "

## 2022-02-10 NOTE — LETTER
"2/10/2022       RE: Narcisa Gorman  [address]  [facility] [address]     Dear Colleague,    Thank you for referring your patient, Narcisa Gorman, to the [facility] [place] at [facility]. Please see a copy of my visit note below.    S:  Pt is a 32 y/o P0 who presents today for evaluation of long periods.  Her periods are regular but the bleeding lasts for 10-12 days.  They start with spotting for a week then heavier bleeding for 3 days then the bleeding stops.  Her  pap is current at [place].  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  She had normal ovarian reserve testing and a pelvic ultrasound when she was seen last here in 1/2020.    O: /71   Pulse 67   Ht 1.6 m (5' 3\")   Wt 64.8 kg (142 lb 12.8 oz)   BMI 25.30 kg/m      gen-pleasant, NAD    A:  32 y/o P0 with recent development of long periods    P:  Recommended starting with a pelvic ultrasound to evaluate for endometrial structural abnormality with a phone visit after to review the results and discuss next steps.  Very briefly discussed treatment options given that she does not desire pregnancy at this time.      Marizol March MD, FACOG      "

## 2022-02-13 ENCOUNTER — HOSPITAL ENCOUNTER (OUTPATIENT)
Dept: MRI IMAGING | Facility: CLINIC | Age: 32
End: 2022-02-13
Attending: SURGERY
Payer: COMMERCIAL

## 2022-02-13 DIAGNOSIS — R22.31 MASS OF RIGHT AXILLA: ICD-10-CM

## 2022-02-13 PROCEDURE — A9585 GADOBUTROL INJECTION: HCPCS | Performed by: SURGERY

## 2022-02-13 PROCEDURE — 255N000002 HC RX 255 OP 636: Performed by: SURGERY

## 2022-02-13 PROCEDURE — 71552 MRI CHEST W/O & W/DYE: CPT

## 2022-02-13 RX ORDER — GADOBUTROL 604.72 MG/ML
6 INJECTION INTRAVENOUS ONCE
Status: COMPLETED | OUTPATIENT
Start: 2022-02-13 | End: 2022-02-13

## 2022-02-13 RX ADMIN — GADOBUTROL 6 ML: 604.72 INJECTION INTRAVENOUS at 08:59

## 2022-02-15 ENCOUNTER — TELEPHONE (OUTPATIENT)
Dept: SURGERY | Facility: CLINIC | Age: 32
End: 2022-02-15
Payer: COMMERCIAL

## 2022-02-15 NOTE — TELEPHONE ENCOUNTER
"Surgery:    Patient contacted and results of chest MRI discussed- no significant findings noted; specifically, no evidence of high right axillary mass.  Images personally reviewed with radiologist- palpable \"mass\" seems to be consistent with prominent right pectoralis muscle.  No need for surgical intervention.  Patient instructed to continue to monitor her symptoms and contact my clinic directly if she notices any changes.  She is in agreement with this plan.    MR CHEST WITHOUT AND WITH CONTRAST  LOCATION: [facility]  DATE/TIME: 02/13/2022, 8:25 AM     FINDINGS:      LUNGS AND PLEURA: Visualized lungs and pleura unremarkable.     MEDIASTINUM: Visualized mediastinum unremarkable.     ADDITIONAL FINDINGS: No mass demonstrated in the axilla, no mass or area of enhancement demonstrated in the region of the palpable abnormality.                                                                      IMPRESSION:  1.  Negative MRI of the chest.    Loyda Denny MD    "

## 2022-02-16 NOTE — TELEPHONE ENCOUNTER
Patient calling back to see if notes can be placed in my chart about this phone call.   Patient would like to be able to refer to the notes when she talks to other doctors.    Please call if have questions.  [phone]  OK to leave VM

## 2022-02-21 ENCOUNTER — ANCILLARY PROCEDURE (OUTPATIENT)
Dept: ULTRASOUND IMAGING | Facility: CLINIC | Age: 32
End: 2022-02-21
Attending: OBSTETRICS & GYNECOLOGY
Payer: COMMERCIAL

## 2022-02-21 DIAGNOSIS — N92.6 PROLONGED PERIODS: ICD-10-CM

## 2022-02-21 PROCEDURE — 76830 TRANSVAGINAL US NON-OB: CPT

## 2022-02-21 PROCEDURE — 76830 TRANSVAGINAL US NON-OB: CPT | Mod: 26 | Performed by: OBSTETRICS & GYNECOLOGY

## 2022-02-28 ENCOUNTER — APPOINTMENT (OUTPATIENT)
Dept: GENERAL RADIOLOGY | Facility: CLINIC | Age: 32
End: 2022-02-28
Attending: EMERGENCY MEDICINE
Payer: COMMERCIAL

## 2022-02-28 ENCOUNTER — HOSPITAL ENCOUNTER (EMERGENCY)
Facility: CLINIC | Age: 32
Discharge: HOME | End: 2022-02-28
Attending: EMERGENCY MEDICINE | Admitting: EMERGENCY MEDICINE
Payer: COMMERCIAL

## 2022-02-28 VITALS
DIASTOLIC BLOOD PRESSURE: 73 MMHG | OXYGEN SATURATION: 98 % | HEIGHT: 64 IN | WEIGHT: 136.69 LBS | RESPIRATION RATE: 18 BRPM | SYSTOLIC BLOOD PRESSURE: 104 MMHG | HEART RATE: 64 BPM | TEMPERATURE: 98 F | BODY MASS INDEX: 23.34 KG/M2

## 2022-02-28 DIAGNOSIS — M79.645 PAIN OF LEFT THUMB: ICD-10-CM

## 2022-02-28 PROCEDURE — 99282 EMERGENCY DEPT VISIT SF MDM: CPT | Performed by: EMERGENCY MEDICINE

## 2022-02-28 PROCEDURE — 250N000013 HC RX MED GY IP 250 OP 250 PS 637: Performed by: EMERGENCY MEDICINE

## 2022-02-28 PROCEDURE — 99284 EMERGENCY DEPT VISIT MOD MDM: CPT | Mod: 25

## 2022-02-28 PROCEDURE — 29125 APPL SHORT ARM SPLINT STATIC: CPT | Mod: LT

## 2022-02-28 PROCEDURE — 73130 X-RAY EXAM OF HAND: CPT | Mod: 26 | Performed by: RADIOLOGY

## 2022-02-28 PROCEDURE — 73130 X-RAY EXAM OF HAND: CPT | Mod: LT

## 2022-02-28 RX ORDER — IBUPROFEN 200 MG
400 TABLET ORAL ONCE
Status: COMPLETED | OUTPATIENT
Start: 2022-02-28 | End: 2022-02-28

## 2022-02-28 RX ADMIN — IBUPROFEN 400 MG: 200 TABLET, FILM COATED ORAL at 23:41

## 2022-03-01 ENCOUNTER — DOCUMENTATION ONLY (OUTPATIENT)
Dept: ORTHOPEDICS | Facility: CLINIC | Age: 32
End: 2022-03-01
Payer: COMMERCIAL

## 2022-03-01 NOTE — ED PROVIDER NOTES
"    [place] EMERGENCY DEPARTMENT ([facility])  2/28/22 VTA  History     Chief Complaint   Patient presents with     Hand Pain     The history is provided by the patient and medical records.     Narcisa Gorman is an otherwise healthy 31 year old female who presents to the emergency department for evaluation of hand pain. Patient reports she was playing volleyball this evening when the ball hit her left thumb and jammed it backwards. She denies injury to any other fingers. She does report slight numbness to the left index finger initially but this has resolved. No weakness. She reports pain with flexion and extension to the left thumb. She did not take anything for the pain. No other symptoms noted. No other injury.      Past Medical History  History reviewed. No pertinent past medical history.  History reviewed. No pertinent surgical history.  No current outpatient medications on file.    No Known Allergies  Family History  No family history on file.  Social History   Social History     Tobacco Use     Smoking status: Never Smoker     Smokeless tobacco: Never Used   Substance Use Topics     Alcohol use: No     Drug use: None      Past medical history, past surgical history, medications, allergies, family history, and social history were reviewed with the patient. No additional pertinent items.       Review of Systems  A complete review of systems was performed with pertinent positives and negatives noted in the HPI, and all other systems negative.    Physical Exam   BP: 104/73  Pulse: 64  Temp: 98  F (36.7  C)  Resp: 18  Height: 162 cm (5' 3.78\")  Weight: 62 kg (136 lb 11 oz)  SpO2: 98 %  Physical Exam  Constitutional:       General: She is not in acute distress.  HENT:      Head: Normocephalic and atraumatic.   Eyes:      Extraocular Movements: Extraocular movements intact.      Pupils: Pupils are equal, round, and reactive to light.   Cardiovascular:      Rate and Rhythm: Normal rate and regular rhythm. "      Pulses: Normal pulses.      Heart sounds: Normal heart sounds.   Pulmonary:      Effort: Pulmonary effort is normal. No respiratory distress.      Breath sounds: Normal breath sounds.   Abdominal:      General: Bowel sounds are normal. There is no distension.      Palpations: Abdomen is soft.   Musculoskeletal:      Cervical back: Normal range of motion and neck supple.      Comments: Tenderness along the left lateral/radial and dorsal thumb at the MCP joint. Has normal range of motion but reports pain with flexion and extension. No tenderness at the left anatomical snuffbox. No tenderness at the left wrist, forearm, elbow, shoulder and otherwise normal range of motion. No laceration. No significant swelling noted. 2+ radial pulses bilaterally. Able to give a thumbs up and a okay sign. No other finger tenderness and otherwise normal range of motion of the fingers of the left hand. Sensation intact to light touch in the median, ulnar, and radial nerve distributions. 5/5  strength bilaterally. Compartments are soft and compressible.    Skin:     General: Skin is warm and dry.      Capillary Refill: Capillary refill takes less than 2 seconds.      Findings: No rash.   Neurological:      General: No focal deficit present.      Mental Status: She is alert and oriented to person, place, and time.      Cranial Nerves: No cranial nerve deficit.      Sensory: No sensory deficit.      Motor: No weakness.   Psychiatric:         Mood and Affect: Mood normal.         ED Course     11:01 PM  The patient was seen and examined by Emma Morrow MD in [facility]    Procedures       The medical record was reviewed and interpreted.  Current images reviewed and interpreted: as below.              Results for orders placed or performed during the hospital encounter of 02/28/22   XR Hand Port Left G/E 3 Views     Status: None    Narrative    EXAM: XR HAND PORT LEFT G/E 3 VIEWS  LOCATION: [facility]  [facility]  DATE/TIME: 2/28/2022 10:35 PM    INDICATION: pain radiating from thumb to wrist after volleyball injury  COMPARISON: None.      Impression    IMPRESSION: Normal joint spaces and alignment. No fracture.     Medications   ibuprofen (ADVIL/MOTRIN) tablet 400 mg (400 mg Oral Given 2/28/22 8029)        Assessments & Plan (with Medical Decision Making)   Patient presents with pain of the left thumb after having it hit and bent backwards while playing volleyball.  She is tender at the left lateral/radial and dorsal thumb at the MCP joint.  Does have normal range of motion but pain with this.  No signs of open fracture.  She is neurovascularly intact.  No other tenderness of the left hand or wrist or remaining fingers.  No tenderness at the left anatomical snuffbox.  No further tenderness of the left upper extremity.  X-ray of the left hand was obtained.  She was given ibuprofen here.  No other injuries.  X-ray does not reveal any evidence of fracture, dislocation, or other acute pathology.  Discussed with the patient that this could potentially be a sprain or ligamentous injury.  With her current discomfort we will place her in a thumb spica splint and have her follow-up with sports medicine for reevaluation within the next week.  She was comfortable with this plan.  She was advised to use Tylenol ibuprofen for discomfort and ice and elevate the area.  She was given indications for return emergency department.  She voiced understanding was comfortable with this plan.  She was discharged home in stable condition.    I have reviewed the nursing notes. I have reviewed the findings, diagnosis, plan and need for follow up with the patient.    There are no discharge medications for this patient.      Final diagnoses:   Pain of left thumb     IAylin, am serving as a trained medical scribe to document services personally performed by Emma Morrow MD based on the provider's statements to me on  February 28, 2022.  This document has been checked and approved by the attending provider.    I, Emma Morrow MD, was physically present and have reviewed and verified the accuracy of this note documented by Aylin Blankenship, medical scribe.      Emma Morrow MD  --  Emma Morrow MD  [facility] EMERGENCY DEPARTMENT  2/28/2022     Emma Morrow MD  05/30/22 7610

## 2022-03-01 NOTE — PROGRESS NOTES
Orthopedic/Sports Medicine Fracture Triage    Incoming call/or message from orders pager.  Fracture type: NO fracture    The patient is in a  off the shelf brace.    Date of injury 2/28/22.    Triaged by: Dr. Blakely.    Determined to be managed Non operatively.    Needs to be seen in 1-2 weeks.    Additional Comments/information: NO fracture, can be seen in sports med. If there is grade three laxity with the thumb MP jt, pt can be referred to surgeon.     Thelma Mendoza, ATC

## 2022-03-01 NOTE — DISCHARGE INSTRUCTIONS
Please make an appointment to follow up with Orthopedics hand Clinic (phone: [phone]) within 7 days.    Wear splint and follow up with the hand surgery clinic (they should call you to make an appointment but if not call this number).     Can take tylenol and ibuprofen and ice and elevate.     Return to the ED for any new or worsening concerns including new weakness, numbness, severe pain.

## 2022-03-01 NOTE — ED TRIAGE NOTES
Patient comes ambulatory to triage coming from home.  Patient was playing volleyball and jammed her finger. Its been painful for an hour and she wants to get it checked out. Ice pack for pain.  VSS   Left lateral outer thumb.

## 2022-03-01 NOTE — TELEPHONE ENCOUNTER
DIAGNOSIS: pain at base of left thumb after jammed finger   APPOINTMENT DATE: 3.7.22   NOTES STATUS DETAILS   DISCHARGE REPORT from the ER Internal 2.28.22 [facility] ED   MEDICATION LIST Internal    XRAYS (IMAGES & REPORTS) Internal 2.28.22 L hand

## 2022-03-07 ENCOUNTER — PRE VISIT (OUTPATIENT)
Dept: ORTHOPEDICS | Facility: CLINIC | Age: 32
End: 2022-03-07

## 2022-03-07 ENCOUNTER — OFFICE VISIT (OUTPATIENT)
Dept: ORTHOPEDICS | Facility: CLINIC | Age: 32
End: 2022-03-07
Payer: COMMERCIAL

## 2022-03-07 DIAGNOSIS — S63.642A SPRAIN OF METACARPOPHALANGEAL (MCP) JOINT OF LEFT THUMB, INITIAL ENCOUNTER: Primary | ICD-10-CM

## 2022-03-07 PROCEDURE — 99203 OFFICE O/P NEW LOW 30 MIN: CPT | Performed by: FAMILY MEDICINE

## 2022-03-07 NOTE — PROGRESS NOTES
[facility]  SPORTS & ORTHOPEDIC CLINIC VISIT     Mar 7, 2022        ASSESSMENT & PLAN    31-year-old with MCP sprain of left thumb.  No significant ligamentous injury is suspected    Reviewed imaging and assessment with patient in detail  I recommended off-the-shelf MCP splint for regular use for the next 2 weeks.  Then with activity only for the following 2 weeks.  Provided with home exercise program.  She will contact us if she would like to pursue formal hand therapy.  Follow-up as needed.      Isiah Armenta MD  [facility] SPORTS MEDICINE [facility]    -----  Chief Complaint   Patient presents with     Left Hand - Pain       SUBJECTIVE  Narcisa Gorman is a/an 31 year old female who is seen as an ER referral for evaluation of left thumb pain.     The patient is seen with their .  The patient is Right handed    Onset: 2/28/22. Patient describes injury as playing volleyball and took a ball to the distal part of her thumb.  Seen in ER on that date.  X-rays performed reportedly negative.  Was provided with a wrist brace.  Overall pain has improved  Location of Pain: left thumb about the MCP joint  Worsened by: flexion  Better with: NA   Treatments tried: rest/activity avoidance and ibuprofen  Associated symptoms: no distal numbness or tingling; denies swelling or warmth    Orthopedic/Surgical history: NO  Social History/Occupation: PhD student       REVIEW OF SYSTEMS:    Do you have fever, chills, weight loss? No    Do you have any vision problems? No    Do you have any chest pain or edema? No    Do you have any shortness of breath or wheezing?  No    Do you have stomach problems? No    Do you have any numbness or focal weakness? No    Do you have diabetes? No    Do you have problems with bleeding or clotting? No    Do you have an rashes or other skin lesions? No    OBJECTIVE:  There were no vitals taken for this visit.     General  - alert, pleasant, no  distress  CV  - normal radial pulse, cap refill brisk  Musculoskeletal - left thumb  - inspection: no atrophy, normal joint alignment, no swelling  - palpation: TTP over the radial and dorsal aspect of the MCP. no CMC tenderness, no soft tissue tenderness, no tenderness at the anatomical snuffbox  - ROM:  MCP 70 deg flexion   0 deg extension   IP 90 deg flexion   0 deg extension  - strength: 5/5  strength, 5/5 wrist abduction, 5/5 flexion, extension, pronation, supination, adduction  - special tests:  No pain or laxity with UCL stress  Neuro  - no numbness, no motor deficit, grossly normal coordination, normal muscle tone  Skin  - no ecchymosis, erythema, warmth, or induration, no obvious rash        RADIOLOGY:    3 view xrays of the hand performed 2/28/2022 and reviewed independently demonstrating no acute fracture or dislocation.  No significant DJD. See EMR for formal radiology report.

## 2022-03-07 NOTE — LETTER
3/7/2022      RE: Narcisa Gorman  [address]  [place] [address]         [facility]  SPORTS & ORTHOPEDIC CLINIC VISIT     Mar 7, 2022        ASSESSMENT & PLAN    31-year-old with MCP sprain of left thumb.  No significant ligamentous injury is suspected    Reviewed imaging and assessment with patient in detail  I recommended off-the-shelf MCP splint for regular use for the next 2 weeks.  Then with activity only for the following 2 weeks.  Provided with home exercise program.  She will contact us if she would like to pursue formal hand therapy.  Follow-up as needed.      Isiah Armenta MD  [facility] SPORTS MEDICINE CLINIC [place]    -----  Chief Complaint   Patient presents with     Left Hand - Pain       SUBJECTIVE  Narcisa Gorman is a/an 31 year old female who is seen as an ER referral for evaluation of left thumb pain.     The patient is seen with their .  The patient is Right handed    Onset: 2/28/22. Patient describes injury as playing volleyball and took a ball to the distal part of her thumb.  Seen in ER on that date.  X-rays performed reportedly negative.  Was provided with a wrist brace.  Overall pain has improved  Location of Pain: left thumb about the MCP joint  Worsened by: flexion  Better with: NA   Treatments tried: rest/activity avoidance and ibuprofen  Associated symptoms: no distal numbness or tingling; denies swelling or warmth    Orthopedic/Surgical history: NO  Social History/Occupation: PhD student       REVIEW OF SYSTEMS:    Do you have fever, chills, weight loss? No    Do you have any vision problems? No    Do you have any chest pain or edema? No    Do you have any shortness of breath or wheezing?  No    Do you have stomach problems? No    Do you have any numbness or focal weakness? No    Do you have diabetes? No    Do you have problems with bleeding or clotting? No    Do you have an rashes or other skin lesions?  No    OBJECTIVE:  There were no vitals taken for this visit.     General  - alert, pleasant, no distress  CV  - normal radial pulse, cap refill brisk  Musculoskeletal - left thumb  - inspection: no atrophy, normal joint alignment, no swelling  - palpation: TTP over the radial and dorsal aspect of the MCP. no CMC tenderness, no soft tissue tenderness, no tenderness at the anatomical snuffbox  - ROM:  MCP 70 deg flexion   0 deg extension   IP 90 deg flexion   0 deg extension  - strength: 5/5  strength, 5/5 wrist abduction, 5/5 flexion, extension, pronation, supination, adduction  - special tests:  No pain or laxity with UCL stress  Neuro  - no numbness, no motor deficit, grossly normal coordination, normal muscle tone  Skin  - no ecchymosis, erythema, warmth, or induration, no obvious rash        RADIOLOGY:    3 view xrays of the hand performed 2/28/2022 and reviewed independently demonstrating no acute fracture or dislocation.  No significant DJD. See EMR for formal radiology report.              Isiah Armenta MD

## 2022-03-16 ENCOUNTER — TELEPHONE (OUTPATIENT)
Dept: OBGYN | Facility: CLINIC | Age: 32
End: 2022-03-16
Payer: COMMERCIAL

## 2022-03-16 NOTE — TELEPHONE ENCOUNTER
[facility] Call Center    Phone Message    May a detailed message be left on voicemail: yes     Reason for Call: Requesting Results   Name/type of test: pelvic US  Date of test: 2/21/22  Was test done at a location other than [facility] (Please fill in the location if not [facility])?: No      Action Taken: Other: whs    Travel Screening: Not Applicable

## 2022-03-22 NOTE — TELEPHONE ENCOUNTER
There is something wrong with that ultrasound read as the result does not show up in her chart-I was on vacation that day so either Abdon or Janna were reading ultrasounds that day.  Can someone check with Daysi and/or Dr. Saldivar about it?  Dr. March

## 2022-03-24 ENCOUNTER — VIRTUAL VISIT (OUTPATIENT)
Dept: OBGYN | Facility: CLINIC | Age: 32
End: 2022-03-24
Attending: OBSTETRICS & GYNECOLOGY
Payer: COMMERCIAL

## 2022-03-24 DIAGNOSIS — N92.0 MENORRHAGIA WITH REGULAR CYCLE: Primary | ICD-10-CM

## 2022-03-24 PROCEDURE — 99212 OFFICE O/P EST SF 10 MIN: CPT | Mod: 95 | Performed by: OBSTETRICS & GYNECOLOGY

## 2022-03-24 RX ORDER — NORGESTIMATE AND ETHINYL ESTRADIOL 0.25-0.035
1 KIT ORAL DAILY
Qty: 84 TABLET | Refills: 4 | Status: SHIPPED | OUTPATIENT
Start: 2022-03-24 | End: 2022-04-21

## 2022-03-24 NOTE — LETTER
3/24/2022       RE: Narcisa Gorman  [address]  [place] [facility] [place] [address]     Dear Colleague,    Thank you for referring your patient, Narcisa Gorman, to the [facility] [place] [facility]. Please see a copy of my visit note below.    Narcisa is a 31 year old who is being evaluated via a billable telephone visit.      What phone number would you like to be contacted at? See EMR  How would you like to obtain your AVS? MyChart      Subjective   Narcisa is a 31 year old who presents to follow up a recent visit for long periods.  She had a ultrasound completed on 2/21/22 that the report has not been completed yet.  I am able to review the images in the PACS viewer.  She has no new concerns today.    Objective       Vitals:  No vitals were obtained today due to virtual visit.    Physical Exam   healthy, alert and no distress  PSYCH: Alert and oriented times 3; coherent speech, normal   rate and volume, able to articulate logical thoughts, able   to abstract reason, no tangential thoughts, no hallucinations   or delusions  Her affect is normal  RESP: No cough, no audible wheezing, able to talk in full sentences  Remainder of exam unable to be completed due to telephone visits    Ultrasound images reviewed-ovaries are normal appearing with a dominant follicle on the right, endometrium 9.71 mm, normal appearing    A:  31 y/o P0 with a history of long periods, no obvious structural abnormality as the cause, not planning a pregnancy at this time.     P: Options for cycle control reviewed-she would like to start with OCPs.  Rx for OrthoCyclen sent, reviewed how to start and continuous vs cyclic dosing.  Plan follow up visit in about 3 months.     Marizol March MD, FACOG      Phone call duration: 10 minutes

## 2022-03-24 NOTE — PROGRESS NOTES
Narcisa is a 31 year old who is being evaluated via a billable telephone visit.      What phone number would you like to be contacted at? See EMR  How would you like to obtain your AVS? Nicolasa      Subjective   Narcisa is a 31 year old who presents to follow up a recent visit for long periods.  She had a ultrasound completed on 2/21/22 that the report has not been completed yet.  I am able to review the images in the PACS viewer.  She has no new concerns today.    Objective       Vitals:  No vitals were obtained today due to virtual visit.    Physical Exam   healthy, alert and no distress  PSYCH: Alert and oriented times 3; coherent speech, normal   rate and volume, able to articulate logical thoughts, able   to abstract reason, no tangential thoughts, no hallucinations   or delusions  Her affect is normal  RESP: No cough, no audible wheezing, able to talk in full sentences  Remainder of exam unable to be completed due to telephone visits    Ultrasound images reviewed-ovaries are normal appearing with a dominant follicle on the right, endometrium 9.71 mm, normal appearing    A:  31 y/o P0 with a history of long periods, no obvious structural abnormality as the cause, not planning a pregnancy at this time.     P: Options for cycle control reviewed-she would like to start with OCPs.  Rx for OrthoCyclen sent, reviewed how to start and continuous vs cyclic dosing.  Plan follow up visit in about 3 months.     Marizol March MD, FACOG      Phone call duration: 10 minutes

## 2022-03-30 NOTE — TELEPHONE ENCOUNTER
Results had not been sent to MD for interpretation. Results sent to Dr. Saldivar and interpreted.

## 2022-04-20 ENCOUNTER — TELEPHONE (OUTPATIENT)
Dept: OBGYN | Facility: CLINIC | Age: 32
End: 2022-04-20
Payer: COMMERCIAL

## 2022-04-20 DIAGNOSIS — N92.0 MENORRHAGIA WITH REGULAR CYCLE: ICD-10-CM

## 2022-04-20 NOTE — TELEPHONE ENCOUNTER
[facility] Call Center    Phone Message    May a detailed message be left on voicemail: yes     Reason for Call: Medication Refill Request    Has the patient contacted the pharmacy for the refill? Yes   Name of medication being requested: ORTHO-CYCLEN  Provider who prescribed the medication: Dr March  Pharmacy: [facility]  [address] [place] [address]  Date medication is needed: As soon as possible   This Rx was sent to [facility] but [facility] is telling pt they do not have the prescription.  Pt requested to try a different pharmacy that is nearby her house.  Please call pt back to confirm this was sent to new pharmacy      Action Taken: Message routed to:  [facility] ([facility]): S    Travel Screening: Not Applicable

## 2022-04-21 RX ORDER — NORGESTIMATE AND ETHINYL ESTRADIOL 0.25-0.035
1 KIT ORAL DAILY
Qty: 84 TABLET | Refills: 4 | Status: SHIPPED | OUTPATIENT
Start: 2022-04-21

## 2022-04-21 NOTE — TELEPHONE ENCOUNTER
Refill request for OCP, patient has not been able to get RX filled at [facility]. Requesting new pharmacy. Sent per request.

## 2022-05-21 ENCOUNTER — HEALTH MAINTENANCE LETTER (OUTPATIENT)
Age: 32
End: 2022-05-21

## 2022-09-18 ENCOUNTER — HEALTH MAINTENANCE LETTER (OUTPATIENT)
Age: 32
End: 2022-09-18

## 2022-11-08 ENCOUNTER — OFFICE VISIT (OUTPATIENT)
Dept: FAMILY MEDICINE | Facility: CLINIC | Age: 32
End: 2022-11-08
Payer: COMMERCIAL

## 2022-11-08 VITALS
TEMPERATURE: 98 F | RESPIRATION RATE: 18 BRPM | OXYGEN SATURATION: 98 % | SYSTOLIC BLOOD PRESSURE: 96 MMHG | WEIGHT: 149.6 LBS | BODY MASS INDEX: 26.51 KG/M2 | DIASTOLIC BLOOD PRESSURE: 61 MMHG | HEART RATE: 69 BPM | HEIGHT: 63 IN

## 2022-11-08 DIAGNOSIS — R21 RASH: Primary | ICD-10-CM

## 2022-11-08 PROCEDURE — 36415 COLL VENOUS BLD VENIPUNCTURE: CPT | Performed by: INTERNAL MEDICINE

## 2022-11-08 PROCEDURE — 80076 HEPATIC FUNCTION PANEL: CPT | Performed by: INTERNAL MEDICINE

## 2022-11-08 PROCEDURE — 99213 OFFICE O/P EST LOW 20 MIN: CPT | Performed by: INTERNAL MEDICINE

## 2022-11-08 RX ORDER — BENZOCAINE/MENTHOL 6 MG-10 MG
LOZENGE MUCOUS MEMBRANE 2 TIMES DAILY
Qty: 30 G | Refills: 0 | Status: SHIPPED | OUTPATIENT
Start: 2022-11-08

## 2022-11-08 RX ORDER — HYDROXYZINE HYDROCHLORIDE 10 MG/1
10 TABLET, FILM COATED ORAL 3 TIMES DAILY PRN
Qty: 30 TABLET | Refills: 0 | Status: SHIPPED | OUTPATIENT
Start: 2022-11-08

## 2022-11-08 ASSESSMENT — PAIN SCALES - GENERAL: PAINLEVEL: NO PAIN (0)

## 2022-11-08 NOTE — PROGRESS NOTES
"  Assessment & Plan   Problem List Items Addressed This Visit    None  Visit Diagnoses     Rash    -  Primary    Relevant Medications    hydrOXYzine (ATARAX) 10 MG tablet    hydrocortisone (CORTAID) 1 % external cream    Other Relevant Orders    Hepatic panel (Albumin, ALT, AST, Bili, Alk Phos, TP)         Probably she had contact dermatitis from the ring on her finger which she removed.  Probably the rash is eczema type or allergic in nature, unknown trigger at this time vs neurogenic (stress induced), advised to use some hydrocortisone 1% on her finger left hand and hydroxyzine prn if there is spreading patches of redness that are itching could be some allergy trigger which we cannot tell ,can still be some form of eczema as well.  Hydroxyzine will help with anxiety and the rash and urticaria.  Follow-up immediately if any worsening of symptoms , SOB, mucosal swelling, tongue lip swelling or other concerns.  All questions answered , we will check liver panel         BMI:   Estimated body mass index is 26.5 kg/m  as calculated from the following:    Height as of this encounter: 1.6 m (5' 3\").    Weight as of this encounter: 67.9 kg (149 lb 9.6 oz).       See Patient Instructions    No follow-ups on file.    Dianelys Holcomb MD  [facility] FRANCHESKA Brewster is a 32 year old presenting for the following health issues:  Derm Problem (Patient coming in for a red skin rash)      History of Present Illness       Reason for visit:  Red skin rash on my fingers  Symptom onset:  1-2 weeks ago    She eats 2-3 servings of fruits and vegetables daily.She consumes 0 sweetened beverage(s) daily.She exercises with enough effort to increase her heart rate 30 to 60 minutes per day.  She exercises with enough effort to increase her heart rate 4 days per week.   She is taking medications regularly.       Patient presents today for evaluation of a rash on her right hand interdigital web on the middle finger " "that is slightly red raised with minimal scaliness on top.  She has a patch on  extensor surface of her distal left forearm slightly erythematous appears slightly elevated.  no blisters or vesicles.  No mucosal swelling or difficulty breathing.  No history of asthma or allergies in the past.  Has not used any new detergents or new foods or medicines.   She wonders whether anxiety is related to the rash.  She reports she is in distress of what is happening in her country.  She describes 1 time she had to remove her left finger ring due to getting some rashes under the ring.  .  Review of Systems   Constitutional, HEENT, cardiovascular, pulmonary, gi and gu systems are negative, except as otherwise noted.      Objective    BP 96/61 (BP Location: Right arm, Patient Position: Sitting, Cuff Size: Adult Regular)   Pulse 69   Temp 98  F (36.7  C) (Temporal)   Resp 18   Ht 1.6 m (5' 3\")   Wt 67.9 kg (149 lb 9.6 oz)   LMP 10/22/2022 (Exact Date)   SpO2 98%   BMI 26.50 kg/m    Body mass index is 26.5 kg/m .  Physical Exam   There is a small red patch around 1 cm on the dorsum aspect of the right hand on proximal middle finger slightly scaly, no blisters or vesicles.  There is another bigger patch on the extensor surface of the distal left forearm around 3 to 4 inches in diameter feels urticarial-like.  No blisters or vesicles.  Appears dry.    Office Visit on 01/18/2022   Component Date Value Ref Range Status     Sodium 01/18/2022 138  133 - 144 mmol/L Final     Potassium 01/18/2022 4.0  3.4 - 5.3 mmol/L Final     Chloride 01/18/2022 105  94 - 109 mmol/L Final     Carbon Dioxide (CO2) 01/18/2022 31  20 - 32 mmol/L Final     Anion Gap 01/18/2022 2 (L)  3 - 14 mmol/L Final     Urea Nitrogen 01/18/2022 12  7 - 30 mg/dL Final     Creatinine 01/18/2022 1.00  0.52 - 1.04 mg/dL Final     Calcium 01/18/2022 9.1  8.5 - 10.1 mg/dL Final     Glucose 01/18/2022 88  70 - 99 mg/dL Final     GFR Estimate 01/18/2022 76  >60 " mL/min/1.73m2 Final    Effective December 21, 2021 eGFRcr in adults is calculated using the 2021 CKD-EPI creatinine equation which includes age and gender (Sena et al., NEJM, DOI: 10.1056/GEOVds5370324)

## 2022-11-09 LAB
ALBUMIN SERPL-MCNC: 3.8 G/DL (ref 3.4–5)
ALP SERPL-CCNC: 57 U/L (ref 40–150)
ALT SERPL W P-5'-P-CCNC: 21 U/L (ref 0–50)
AST SERPL W P-5'-P-CCNC: 16 U/L (ref 0–45)
BILIRUB DIRECT SERPL-MCNC: 0.2 MG/DL (ref 0–0.2)
BILIRUB SERPL-MCNC: 0.8 MG/DL (ref 0.2–1.3)
PROT SERPL-MCNC: 7.4 G/DL (ref 6.8–8.8)

## 2023-06-04 ENCOUNTER — HEALTH MAINTENANCE LETTER (OUTPATIENT)
Age: 33
End: 2023-06-04

## 2023-09-01 ENCOUNTER — TELEPHONE (OUTPATIENT)
Dept: FAMILY MEDICINE | Facility: CLINIC | Age: 33
End: 2023-09-01
Payer: COMMERCIAL

## 2023-09-01 NOTE — CONFIDENTIAL NOTE
Patient Quality Outreach    Patient is due for the following:   Cervical Cancer Screening - PAP Needed    Next Steps:   Schedule a office visit for Pap    Type of outreach:    Sent [employer] message.      Questions for provider review:    None           Keegan Hunter MA

## 2024-04-24 ENCOUNTER — APPOINTMENT (OUTPATIENT)
Dept: URBAN - METROPOLITAN AREA CLINIC 211 | Age: 34
Setting detail: DERMATOLOGY
End: 2024-04-24

## 2024-04-24 DIAGNOSIS — D18.0 HEMANGIOMA: ICD-10-CM

## 2024-04-24 DIAGNOSIS — Z71.89 OTHER SPECIFIED COUNSELING: ICD-10-CM

## 2024-04-24 DIAGNOSIS — L20.89 OTHER ATOPIC DERMATITIS: ICD-10-CM

## 2024-04-24 DIAGNOSIS — D22 MELANOCYTIC NEVI: ICD-10-CM

## 2024-04-24 DIAGNOSIS — L81.4 OTHER MELANIN HYPERPIGMENTATION: ICD-10-CM

## 2024-04-24 DIAGNOSIS — Z12.83 ENCOUNTER FOR SCREENING FOR MALIGNANT NEOPLASM OF SKIN: ICD-10-CM

## 2024-04-24 DIAGNOSIS — L82.1 OTHER SEBORRHEIC KERATOSIS: ICD-10-CM

## 2024-04-24 DIAGNOSIS — L50.3 DERMATOGRAPHIC URTICARIA: ICD-10-CM

## 2024-04-24 DIAGNOSIS — L70.0 ACNE VULGARIS: ICD-10-CM

## 2024-04-24 PROBLEM — D22.39 MELANOCYTIC NEVI OF OTHER PARTS OF FACE: Status: ACTIVE | Noted: 2024-04-24

## 2024-04-24 PROBLEM — D22.5 MELANOCYTIC NEVI OF TRUNK: Status: ACTIVE | Noted: 2024-04-24

## 2024-04-24 PROBLEM — D18.01 HEMANGIOMA OF SKIN AND SUBCUTANEOUS TISSUE: Status: ACTIVE | Noted: 2024-04-24

## 2024-04-24 PROCEDURE — OTHER COUNSELING: OTHER

## 2024-04-24 PROCEDURE — OTHER MIPS QUALITY: OTHER

## 2024-04-24 PROCEDURE — OTHER REASSURANCE: OTHER

## 2024-04-24 PROCEDURE — OTHER SUNSCREEN RECOMMENDATIONS: OTHER

## 2024-04-24 PROCEDURE — OTHER PRESCRIPTION: OTHER

## 2024-04-24 PROCEDURE — OTHER TREATMENT REGIMEN: OTHER

## 2024-04-24 PROCEDURE — 99204 OFFICE O/P NEW MOD 45 MIN: CPT

## 2024-04-24 RX ORDER — EMOLLIENT COMBINATION NO.32
EMULSION, EXTENDED RELEASE TOPICAL
Qty: 225 | Refills: 11 | Status: ERX | COMMUNITY
Start: 2024-04-24

## 2024-04-24 ASSESSMENT — LOCATION DETAILED DESCRIPTION DERM
LOCATION DETAILED: LEFT MEDIAL INFERIOR CHEST
LOCATION DETAILED: RIGHT NARIS
LOCATION DETAILED: LEFT CENTRAL MALAR CHEEK
LOCATION DETAILED: EPIGASTRIC SKIN
LOCATION DETAILED: LEFT CHIN
LOCATION DETAILED: LEFT LATERAL ABDOMEN

## 2024-04-24 ASSESSMENT — LOCATION SIMPLE DESCRIPTION DERM
LOCATION SIMPLE: ABDOMEN
LOCATION SIMPLE: LEFT CHEEK
LOCATION SIMPLE: RIGHT NOSE
LOCATION SIMPLE: CHIN
LOCATION SIMPLE: CHEST

## 2024-04-24 ASSESSMENT — LOCATION ZONE DERM
LOCATION ZONE: FACE
LOCATION ZONE: TRUNK
LOCATION ZONE: NOSE

## 2024-04-24 ASSESSMENT — SEVERITY ASSESSMENT OVERALL AMONG ALL PATIENTS
IN YOUR EXPERIENCE, AMONG ALL PATIENTS YOU HAVE SEEN WITH THIS CONDITION, HOW SEVERE IS THIS PATIENT'S CONDITION?: ALMOST CLEAR

## 2024-04-24 ASSESSMENT — SEVERITY ASSESSMENT 2020: SEVERITY 2020: ALMOST CLEAR

## 2024-04-24 NOTE — PROCEDURE: TREATMENT REGIMEN
Detail Level: Zone
Plan: 1. Begin to take Zyrtec once daily.  Can increase to BID if not under control with QD dosing.
Hide Aquaphor Products: No
Plan: 1. Begin cleansing face twice daily with gentle cleanser. Recommend: LRP effaclar cleanser
Action 4: Continue

## 2024-04-24 NOTE — PROCEDURE: COUNSELING
REVIEW OF CARDIOVASCULAR SYSTEMS:  Cardiovascular problem(s): Shortness of Breath on Exertion    Patient does not smoke.    Patient does take aspirin.      
Detail Level: Generalized
Detail Level: Simple
Detail Level: Detailed
Skin Checks Recommendations: Full body skin check annually
Sunscreen Recommendations: applied daily to exposed areas
Detail Level: Zone
Birth Control Pills Counseling: Birth Control Pill Counseling: I discussed with the patient the potential side effects of OCPs including but not limited to increased risk of stroke, heart attack, thrombophlebitis, deep venous thrombosis, hepatic adenomas, breast changes, GI upset, headaches, and depression.  The patient verbalized understanding of the proper use and possible adverse effects of OCPs. All of the patient's questions and concerns were addressed.
Tazorac Counseling:  Patient advised that medication is irritating and drying.  Patient may need to apply sparingly and wash off after an hour before eventually leaving it on overnight.  The patient verbalized understanding of the proper use and possible adverse effects of tazorac.  All of the patient's questions and concerns were addressed.
Sarecycline Counseling: Patient advised regarding possible photosensitivity and discoloration of the teeth, skin, lips, tongue and gums.  Patient instructed to avoid sunlight, if possible.  When exposed to sunlight, patients should wear protective clothing, sunglasses, and sunscreen.  The patient was instructed to call the office immediately if the following severe adverse effects occur:  hearing changes, easy bruising/bleeding, severe headache, or vision changes.  The patient verbalized understanding of the proper use and possible adverse effects of sarecycline.  All of the patient's questions and concerns were addressed.
Aklief Pregnancy And Lactation Text: It is unknown if this medication is safe to use during pregnancy.  It is unknown if this medication is excreted in breast milk.  Breastfeeding women should use the topical cream on the smallest area of the skin for the shortest time needed while breastfeeding.  Do not apply to nipple and areola.
Erythromycin Pregnancy And Lactation Text: This medication is Pregnancy Category B and is considered safe during pregnancy. It is also excreted in breast milk.
Tetracycline Pregnancy And Lactation Text: This medication is Pregnancy Category D and not consider safe during pregnancy. It is also excreted in breast milk.
Topical Retinoid counseling:  Patient advised to apply a pea-sized amount only at bedtime and wait 30 minutes after washing their face before applying.  If too drying, patient may add a non-comedogenic moisturizer. The patient verbalized understanding of the proper use and possible adverse effects of retinoids.  All of the patient's questions and concerns were addressed.
Topical Sulfur Applications Pregnancy And Lactation Text: This medication is Pregnancy Category C and has an unknown safety profile during pregnancy. It is unknown if this topical medication is excreted in breast milk.
Winlevi Pregnancy And Lactation Text: This medication is considered safe during pregnancy and breastfeeding.
Dapsone Pregnancy And Lactation Text: This medication is Pregnancy Category C and is not considered safe during pregnancy or breast feeding.
Spironolactone Pregnancy And Lactation Text: This medication can cause feminization of the male fetus and should be avoided during pregnancy. The active metabolite is also found in breast milk.
Benzoyl Peroxide Counseling: Patient counseled that medicine may cause skin irritation and bleach clothing.  In the event of skin irritation, the patient was advised to reduce the amount of the drug applied or use it less frequently.   The patient verbalized understanding of the proper use and possible adverse effects of benzoyl peroxide.  All of the patient's questions and concerns were addressed.
Topical Clindamycin Pregnancy And Lactation Text: This medication is Pregnancy Category B and is considered safe during pregnancy. It is unknown if it is excreted in breast milk.
High Dose Vitamin A Counseling: Side effects reviewed, pt to contact office should one occur.
Azithromycin Counseling:  I discussed with the patient the risks of azithromycin including but not limited to GI upset, allergic reaction, drug rash, diarrhea, and yeast infections.
Bactrim Counseling:  I discussed with the patient the risks of sulfa antibiotics including but not limited to GI upset, allergic reaction, drug rash, diarrhea, dizziness, photosensitivity, and yeast infections.  Rarely, more serious reactions can occur including but not limited to aplastic anemia, agranulocytosis, methemoglobinemia, blood dyscrasias, liver or kidney failure, lung infiltrates or desquamative/blistering drug rashes.
Doxycycline Pregnancy And Lactation Text: This medication is Pregnancy Category D and not consider safe during pregnancy. It is also excreted in breast milk but is considered safe for shorter treatment courses.
Minocycline Counseling: Patient advised regarding possible photosensitivity and discoloration of the teeth, skin, lips, tongue and gums.  Patient instructed to avoid sunlight, if possible.  When exposed to sunlight, patients should wear protective clothing, sunglasses, and sunscreen.  The patient was instructed to call the office immediately if the following severe adverse effects occur:  hearing changes, easy bruising/bleeding, severe headache, or vision changes.  The patient verbalized understanding of the proper use and possible adverse effects of minocycline.  All of the patient's questions and concerns were addressed.
Topical Retinoid Pregnancy And Lactation Text: This medication is Pregnancy Category C. It is unknown if this medication is excreted in breast milk.
Aklief counseling:  Patient advised to apply a pea-sized amount only at bedtime and wait 30 minutes after washing their face before applying.  If too drying, patient may add a non-comedogenic moisturizer.  The most commonly reported side effects including irritation, redness, scaling, dryness, stinging, burning, itching, and increased risk of sunburn.  The patient verbalized understanding of the proper use and possible adverse effects of retinoids.  All of the patient's questions and concerns were addressed.
Erythromycin Counseling:  I discussed with the patient the risks of erythromycin including but not limited to GI upset, allergic reaction, drug rash, diarrhea, increase in liver enzymes, and yeast infections.
Bactrim Pregnancy And Lactation Text: This medication is Pregnancy Category D and is known to cause fetal risk.  It is also excreted in breast milk.
Azelaic Acid Counseling: Patient counseled that medicine may cause skin irritation and to avoid applying near the eyes.  In the event of skin irritation, the patient was advised to reduce the amount of the drug applied or use it less frequently.   The patient verbalized understanding of the proper use and possible adverse effects of azelaic acid.  All of the patient's questions and concerns were addressed.
Tazorac Pregnancy And Lactation Text: This medication is not safe during pregnancy. It is unknown if this medication is excreted in breast milk.
Winlevi Counseling:  I discussed with the patient the risks of topical clascoterone including but not limited to erythema, scaling, itching, and stinging. Patient voiced their understanding.
Birth Control Pills Pregnancy And Lactation Text: This medication should be avoided if pregnant and for the first 30 days post-partum.
Isotretinoin Counseling: Patient should get monthly blood tests, not donate blood, not drive at night if vision affected, not share medication, and not undergo elective surgery for 6 months after tx completed. Side effects reviewed, pt to contact office should one occur.
Azithromycin Pregnancy And Lactation Text: This medication is considered safe during pregnancy and is also secreted in breast milk.
Doxycycline Counseling:  Patient counseled regarding possible photosensitivity and increased risk for sunburn.  Patient instructed to avoid sunlight, if possible.  When exposed to sunlight, patients should wear protective clothing, sunglasses, and sunscreen.  The patient was instructed to call the office immediately if the following severe adverse effects occur:  hearing changes, easy bruising/bleeding, severe headache, or vision changes.  The patient verbalized understanding of the proper use and possible adverse effects of doxycycline.  All of the patient's questions and concerns were addressed.
Spironolactone Counseling: Patient advised regarding risks of diarrhea, abdominal pain, hyperkalemia, birth defects (for female patients), liver toxicity and renal toxicity. The patient may need blood work to monitor liver and kidney function and potassium levels while on therapy. The patient verbalized understanding of the proper use and possible adverse effects of spironolactone.  All of the patient's questions and concerns were addressed.
Azelaic Acid Pregnancy And Lactation Text: This medication is considered safe during pregnancy and breast feeding.
Isotretinoin Pregnancy And Lactation Text: This medication is Pregnancy Category X and is considered extremely dangerous during pregnancy. It is unknown if it is excreted in breast milk.
Use Enhanced Medication Counseling?: No
Dapsone Counseling: I discussed with the patient the risks of dapsone including but not limited to hemolytic anemia, agranulocytosis, rashes, methemoglobinemia, kidney failure, peripheral neuropathy, headaches, GI upset, and liver toxicity.  Patients who start dapsone require monitoring including baseline LFTs and weekly CBCs for the first month, then every month thereafter.  The patient verbalized understanding of the proper use and possible adverse effects of dapsone.  All of the patient's questions and concerns were addressed.
Topical Clindamycin Counseling: Patient counseled that this medication may cause skin irritation or allergic reactions.  In the event of skin irritation, the patient was advised to reduce the amount of the drug applied or use it less frequently.   The patient verbalized understanding of the proper use and possible adverse effects of clindamycin.  All of the patient's questions and concerns were addressed.
Topical Sulfur Applications Counseling: Topical Sulfur Counseling: Patient counseled that this medication may cause skin irritation or allergic reactions.  In the event of skin irritation, the patient was advised to reduce the amount of the drug applied or use it less frequently.   The patient verbalized understanding of the proper use and possible adverse effects of topical sulfur application.  All of the patient's questions and concerns were addressed.
Tetracycline Counseling: Patient counseled regarding possible photosensitivity and increased risk for sunburn.  Patient instructed to avoid sunlight, if possible.  When exposed to sunlight, patients should wear protective clothing, sunglasses, and sunscreen.  The patient was instructed to call the office immediately if the following severe adverse effects occur:  hearing changes, easy bruising/bleeding, severe headache, or vision changes.  The patient verbalized understanding of the proper use and possible adverse effects of tetracycline.  All of the patient's questions and concerns were addressed. Patient understands to avoid pregnancy while on therapy due to potential birth defects.
Benzoyl Peroxide Pregnancy And Lactation Text: This medication is Pregnancy Category C. It is unknown if benzoyl peroxide is excreted in breast milk.
High Dose Vitamin A Pregnancy And Lactation Text: High dose vitamin A therapy is contraindicated during pregnancy and breast feeding.

## 2024-07-14 ENCOUNTER — HEALTH MAINTENANCE LETTER (OUTPATIENT)
Age: 34
End: 2024-07-14

## 2025-04-25 ENCOUNTER — APPOINTMENT (OUTPATIENT)
Dept: URBAN - METROPOLITAN AREA CLINIC 211 | Age: 35
Setting detail: DERMATOLOGY
End: 2025-04-25

## 2025-04-25 DIAGNOSIS — D22 MELANOCYTIC NEVI: ICD-10-CM

## 2025-04-25 DIAGNOSIS — Q819 OTHER SPECIFIED ANOMALIES OF SKIN: ICD-10-CM

## 2025-04-25 DIAGNOSIS — Z71.89 OTHER SPECIFIED COUNSELING: ICD-10-CM

## 2025-04-25 DIAGNOSIS — L82.1 OTHER SEBORRHEIC KERATOSIS: ICD-10-CM

## 2025-04-25 DIAGNOSIS — L85.3 XEROSIS CUTIS: ICD-10-CM

## 2025-04-25 DIAGNOSIS — Q828 OTHER SPECIFIED ANOMALIES OF SKIN: ICD-10-CM

## 2025-04-25 DIAGNOSIS — L81.4 OTHER MELANIN HYPERPIGMENTATION: ICD-10-CM

## 2025-04-25 DIAGNOSIS — Q826 OTHER SPECIFIED ANOMALIES OF SKIN: ICD-10-CM

## 2025-04-25 DIAGNOSIS — D18.0 HEMANGIOMA: ICD-10-CM

## 2025-04-25 DIAGNOSIS — Z12.83 ENCOUNTER FOR SCREENING FOR MALIGNANT NEOPLASM OF SKIN: ICD-10-CM

## 2025-04-25 DIAGNOSIS — L70.0 ACNE VULGARIS: ICD-10-CM

## 2025-04-25 PROBLEM — D22.5 MELANOCYTIC NEVI OF TRUNK: Status: ACTIVE | Noted: 2025-04-25

## 2025-04-25 PROBLEM — L85.8 OTHER SPECIFIED EPIDERMAL THICKENING: Status: ACTIVE | Noted: 2025-04-25

## 2025-04-25 PROBLEM — D18.01 HEMANGIOMA OF SKIN AND SUBCUTANEOUS TISSUE: Status: ACTIVE | Noted: 2025-04-25

## 2025-04-25 PROCEDURE — OTHER MIPS QUALITY: OTHER

## 2025-04-25 PROCEDURE — OTHER REASSURANCE: OTHER

## 2025-04-25 PROCEDURE — OTHER COUNSELING: OTHER

## 2025-04-25 PROCEDURE — 99213 OFFICE O/P EST LOW 20 MIN: CPT

## 2025-04-25 PROCEDURE — OTHER TREATMENT REGIMEN: OTHER

## 2025-04-25 ASSESSMENT — LOCATION DETAILED DESCRIPTION DERM
LOCATION DETAILED: LEFT PROXIMAL DORSAL FOREARM
LOCATION DETAILED: RIGHT SUPERIOR UPPER BACK
LOCATION DETAILED: RIGHT MEDIAL UPPER BACK
LOCATION DETAILED: RIGHT DORSAL FOOT
LOCATION DETAILED: LEFT DISTAL PRETIBIAL REGION
LOCATION DETAILED: LEFT POSTERIOR SHOULDER
LOCATION DETAILED: RIGHT POSTERIOR SHOULDER
LOCATION DETAILED: LEFT DORSAL FOOT
LOCATION DETAILED: RIGHT PROXIMAL DORSAL FOREARM
LOCATION DETAILED: RIGHT LOWER CUTANEOUS LIP
LOCATION DETAILED: RIGHT PROXIMAL PRETIBIAL REGION

## 2025-04-25 ASSESSMENT — LOCATION SIMPLE DESCRIPTION DERM
LOCATION SIMPLE: RIGHT FOOT
LOCATION SIMPLE: RIGHT UPPER BACK
LOCATION SIMPLE: RIGHT PRETIBIAL REGION
LOCATION SIMPLE: LEFT PRETIBIAL REGION
LOCATION SIMPLE: RIGHT FOREARM
LOCATION SIMPLE: RIGHT LIP
LOCATION SIMPLE: LEFT FOOT
LOCATION SIMPLE: RIGHT SHOULDER
LOCATION SIMPLE: LEFT FOREARM
LOCATION SIMPLE: LEFT SHOULDER

## 2025-04-25 ASSESSMENT — LOCATION ZONE DERM
LOCATION ZONE: LEG
LOCATION ZONE: TRUNK
LOCATION ZONE: FEET
LOCATION ZONE: LIP
LOCATION ZONE: ARM

## 2025-04-25 NOTE — PROCEDURE: REASSURANCE
Hide Additional Notes?: No
Detail Level: Simple
Detail Level: Detailed
Additional Notes (Optional): Keep skin barrier moisturized daily (samples of Amlactin provided)

## 2025-04-25 NOTE — PROCEDURE: COUNSELING
Detail Level: Zone
Detail Level: Detailed
Dapsone Pregnancy And Lactation Text: This medication is Pregnancy Category C and is not considered safe during pregnancy or breast feeding.
Azelaic Acid Pregnancy And Lactation Text: This medication is considered safe during pregnancy and breast feeding.
Spironolactone Counseling: Patient advised regarding risks of diarrhea, abdominal pain, hyperkalemia, birth defects (for female patients), liver toxicity and renal toxicity. The patient may need blood work to monitor liver and kidney function and potassium levels while on therapy. The patient verbalized understanding of the proper use and possible adverse effects of spironolactone.  All of the patient's questions and concerns were addressed.
Birth Control Pills Pregnancy And Lactation Text: This medication should be avoided if pregnant and for the first 30 days post-partum.
Benzoyl Peroxide Pregnancy And Lactation Text: This medication is Pregnancy Category C. It is unknown if benzoyl peroxide is excreted in breast milk.
Topical Sulfur Applications Counseling: Topical Sulfur Counseling: Patient counseled that this medication may cause skin irritation or allergic reactions.  In the event of skin irritation, the patient was advised to reduce the amount of the drug applied or use it less frequently.   The patient verbalized understanding of the proper use and possible adverse effects of topical sulfur application.  All of the patient's questions and concerns were addressed.
Winlevi Counseling:  I discussed with the patient the risks of topical clascoterone including but not limited to erythema, scaling, itching, and stinging. Patient voiced their understanding.
Tetracycline Counseling: Patient counseled regarding possible photosensitivity and increased risk for sunburn.  Patient instructed to avoid sunlight, if possible.  When exposed to sunlight, patients should wear protective clothing, sunglasses, and sunscreen.  The patient was instructed to call the office immediately if the following severe adverse effects occur:  hearing changes, easy bruising/bleeding, severe headache, or vision changes.  The patient verbalized understanding of the proper use and possible adverse effects of tetracycline.  All of the patient's questions and concerns were addressed. Patient understands to avoid pregnancy while on therapy due to potential birth defects.
Erythromycin Pregnancy And Lactation Text: This medication is Pregnancy Category B and is considered safe during pregnancy. It is also excreted in breast milk.
Isotretinoin Pregnancy And Lactation Text: This medication is Pregnancy Category X and is considered extremely dangerous during pregnancy. It is unknown if it is excreted in breast milk.
Bactrim Pregnancy And Lactation Text: This medication is Pregnancy Category D and is known to cause fetal risk.  It is also excreted in breast milk.
Aklief counseling:  Patient advised to apply a pea-sized amount only at bedtime and wait 30 minutes after washing their face before applying.  If too drying, patient may add a non-comedogenic moisturizer.  The most commonly reported side effects including irritation, redness, scaling, dryness, stinging, burning, itching, and increased risk of sunburn.  The patient verbalized understanding of the proper use and possible adverse effects of retinoids.  All of the patient's questions and concerns were addressed.
Topical Retinoid Pregnancy And Lactation Text: This medication is Pregnancy Category C. It is unknown if this medication is excreted in breast milk.
High Dose Vitamin A Pregnancy And Lactation Text: High dose vitamin A therapy is contraindicated during pregnancy and breast feeding.
Azithromycin Pregnancy And Lactation Text: This medication is considered safe during pregnancy and is also secreted in breast milk.
Erythromycin Counseling:  I discussed with the patient the risks of erythromycin including but not limited to GI upset, allergic reaction, drug rash, diarrhea, increase in liver enzymes, and yeast infections.
Tazorac Pregnancy And Lactation Text: This medication is not safe during pregnancy. It is unknown if this medication is excreted in breast milk.
Azelaic Acid Counseling: Patient counseled that medicine may cause skin irritation and to avoid applying near the eyes.  In the event of skin irritation, the patient was advised to reduce the amount of the drug applied or use it less frequently.   The patient verbalized understanding of the proper use and possible adverse effects of azelaic acid.  All of the patient's questions and concerns were addressed.
Doxycycline Counseling:  Patient counseled regarding possible photosensitivity and increased risk for sunburn.  Patient instructed to avoid sunlight, if possible.  When exposed to sunlight, patients should wear protective clothing, sunglasses, and sunscreen.  The patient was instructed to call the office immediately if the following severe adverse effects occur:  hearing changes, easy bruising/bleeding, severe headache, or vision changes.  The patient verbalized understanding of the proper use and possible adverse effects of doxycycline.  All of the patient's questions and concerns were addressed.
Minocycline Pregnancy And Lactation Text: This medication is Pregnancy Category D and not consider safe during pregnancy. It is also excreted in breast milk.
Benzoyl Peroxide Counseling: Patient counseled that medicine may cause skin irritation and bleach clothing.  In the event of skin irritation, the patient was advised to reduce the amount of the drug applied or use it less frequently.   The patient verbalized understanding of the proper use and possible adverse effects of benzoyl peroxide.  All of the patient's questions and concerns were addressed.
Topical Clindamycin Pregnancy And Lactation Text: This medication is Pregnancy Category B and is considered safe during pregnancy. It is unknown if it is excreted in breast milk.
Spironolactone Pregnancy And Lactation Text: This medication can cause feminization of the male fetus and should be avoided during pregnancy. The active metabolite is also found in breast milk.
Dapsone Counseling: I discussed with the patient the risks of dapsone including but not limited to hemolytic anemia, agranulocytosis, rashes, methemoglobinemia, kidney failure, peripheral neuropathy, headaches, GI upset, and liver toxicity.  Patients who start dapsone require monitoring including baseline LFTs and weekly CBCs for the first month, then every month thereafter.  The patient verbalized understanding of the proper use and possible adverse effects of dapsone.  All of the patient's questions and concerns were addressed.
Isotretinoin Counseling: Patient should get monthly blood tests, not donate blood, not drive at night if vision affected, not share medication, and not undergo elective surgery for 6 months after tx completed. Side effects reviewed, pt to contact office should one occur.
Topical Retinoid counseling:  Patient advised to apply a pea-sized amount only at bedtime and wait 30 minutes after washing their face before applying.  If too drying, patient may add a non-comedogenic moisturizer. The patient verbalized understanding of the proper use and possible adverse effects of retinoids.  All of the patient's questions and concerns were addressed.
Topical Sulfur Applications Pregnancy And Lactation Text: This medication is Pregnancy Category C and has an unknown safety profile during pregnancy. It is unknown if this topical medication is excreted in breast milk.
Birth Control Pills Counseling: Birth Control Pill Counseling: I discussed with the patient the potential side effects of OCPs including but not limited to increased risk of stroke, heart attack, thrombophlebitis, deep venous thrombosis, hepatic adenomas, breast changes, GI upset, headaches, and depression.  The patient verbalized understanding of the proper use and possible adverse effects of OCPs. All of the patient's questions and concerns were addressed.
Bactrim Counseling:  I discussed with the patient the risks of sulfa antibiotics including but not limited to GI upset, allergic reaction, drug rash, diarrhea, dizziness, photosensitivity, and yeast infections.  Rarely, more serious reactions can occur including but not limited to aplastic anemia, agranulocytosis, methemoglobinemia, blood dyscrasias, liver or kidney failure, lung infiltrates or desquamative/blistering drug rashes.
Include Pregnancy/Lactation Warning?: Add Automatically Based on Childbearing Potential and Patient Age
High Dose Vitamin A Counseling: Side effects reviewed, pt to contact office should one occur.
Winlevi Pregnancy And Lactation Text: This medication is considered safe during pregnancy and breastfeeding.
Azithromycin Counseling:  I discussed with the patient the risks of azithromycin including but not limited to GI upset, allergic reaction, drug rash, diarrhea, and yeast infections.
Use Enhanced Medication Counseling?: No
Tazorac Counseling:  Patient advised that medication is irritating and drying.  Patient may need to apply sparingly and wash off after an hour before eventually leaving it on overnight.  The patient verbalized understanding of the proper use and possible adverse effects of tazorac.  All of the patient's questions and concerns were addressed.
Minocycline Counseling: Patient advised regarding possible photosensitivity and discoloration of the teeth, skin, lips, tongue and gums.  Patient instructed to avoid sunlight, if possible.  When exposed to sunlight, patients should wear protective clothing, sunglasses, and sunscreen.  The patient was instructed to call the office immediately if the following severe adverse effects occur:  hearing changes, easy bruising/bleeding, severe headache, or vision changes.  The patient verbalized understanding of the proper use and possible adverse effects of minocycline.  All of the patient's questions and concerns were addressed.
Aklief Pregnancy And Lactation Text: It is unknown if this medication is safe to use during pregnancy.  It is unknown if this medication is excreted in breast milk.  Breastfeeding women should use the topical cream on the smallest area of the skin for the shortest time needed while breastfeeding.  Do not apply to nipple and areola.
Topical Clindamycin Counseling: Patient counseled that this medication may cause skin irritation or allergic reactions.  In the event of skin irritation, the patient was advised to reduce the amount of the drug applied or use it less frequently.   The patient verbalized understanding of the proper use and possible adverse effects of clindamycin.  All of the patient's questions and concerns were addressed.
Sarecycline Counseling: Patient advised regarding possible photosensitivity and discoloration of the teeth, skin, lips, tongue and gums.  Patient instructed to avoid sunlight, if possible.  When exposed to sunlight, patients should wear protective clothing, sunglasses, and sunscreen.  The patient was instructed to call the office immediately if the following severe adverse effects occur:  hearing changes, easy bruising/bleeding, severe headache, or vision changes.  The patient verbalized understanding of the proper use and possible adverse effects of sarecycline.  All of the patient's questions and concerns were addressed.
Doxycycline Pregnancy And Lactation Text: This medication is Pregnancy Category D and not consider safe during pregnancy. It is also excreted in breast milk but is considered safe for shorter treatment courses.

## 2025-04-25 NOTE — PROCEDURE: TREATMENT REGIMEN
Plan: 1. Keep skin barrier moisturized daily (samples of Amlactin provided)
Detail Level: Zone
Action 4: Continue
Hide Cerave Products: No
Plan: 1. Apply La Roche Posay Effaclar SA Serum to affected area daily (samples provided

## 2025-07-19 ENCOUNTER — HEALTH MAINTENANCE LETTER (OUTPATIENT)
Age: 35
End: 2025-07-19